# Patient Record
Sex: FEMALE | Race: OTHER | Employment: FULL TIME | URBAN - METROPOLITAN AREA
[De-identification: names, ages, dates, MRNs, and addresses within clinical notes are randomized per-mention and may not be internally consistent; named-entity substitution may affect disease eponyms.]

---

## 2017-05-23 PROBLEM — M43.16 SPONDYLOLISTHESIS OF LUMBAR REGION: Status: ACTIVE | Noted: 2017-05-23

## 2017-10-25 ENCOUNTER — HOSPITAL ENCOUNTER (OUTPATIENT)
Dept: MAMMOGRAPHY | Age: 52
Discharge: HOME | End: 2017-10-25
Attending: FAMILY MEDICINE
Payer: COMMERCIAL

## 2017-10-25 DIAGNOSIS — Z12.31 VISIT FOR SCREENING MAMMOGRAM: ICD-10-CM

## 2017-10-25 PROCEDURE — 77067 SCR MAMMO BI INCL CAD: CPT

## 2018-06-07 ENCOUNTER — HOSPITAL ENCOUNTER (INPATIENT)
Dept: HOSPITAL 90 - EDH | Age: 53
LOS: 2 days | Discharge: HOME | DRG: 812 | End: 2018-06-09
Attending: FAMILY MEDICINE | Admitting: FAMILY MEDICINE
Payer: COMMERCIAL

## 2018-06-07 VITALS — SYSTOLIC BLOOD PRESSURE: 97 MMHG | DIASTOLIC BLOOD PRESSURE: 56 MMHG

## 2018-06-07 VITALS — BODY MASS INDEX: 27.66 KG/M2 | WEIGHT: 156.1 LBS | HEIGHT: 63 IN

## 2018-06-07 VITALS — SYSTOLIC BLOOD PRESSURE: 110 MMHG | DIASTOLIC BLOOD PRESSURE: 69 MMHG

## 2018-06-07 DIAGNOSIS — D64.9: Primary | ICD-10-CM

## 2018-06-07 DIAGNOSIS — E11.9: ICD-10-CM

## 2018-06-07 DIAGNOSIS — Z79.899: ICD-10-CM

## 2018-06-07 DIAGNOSIS — N30.00: ICD-10-CM

## 2018-06-07 DIAGNOSIS — I10: ICD-10-CM

## 2018-06-07 DIAGNOSIS — E78.5: ICD-10-CM

## 2018-06-07 LAB
ALBUMIN SERPL-MCNC: 3.4 G/DL (ref 3.5–5)
ALT SERPL-CCNC: 24 U/L (ref 12–78)
APTT PPP: 29.3 SEC (ref 26.3–35.5)
AST SERPL-CCNC: 17 U/L (ref 10–37)
BASOPHILS NFR BLD AUTO: 0.5 % (ref 0–5)
BILIRUB SERPL-MCNC: 0.7 MG/DL (ref 0.2–1)
BUN SERPL-MCNC: 5 MG/DL (ref 7–18)
CHLORIDE SERPL-SCNC: 98 MMOL/L (ref 101–111)
CK MB SERPL-MCNC: < 0.5 NG/ML (ref 0.5–3.6)
CK SERPL-CCNC: 51 U/L (ref 21–232)
CO2 SERPL-SCNC: 23 MMOL/L (ref 21–32)
CREAT SERPL-MCNC: 0.8 MG/DL (ref 0.5–1.5)
EOSINOPHIL NFR BLD AUTO: 0.2 % (ref 0–8)
ERYTHROCYTE [DISTWIDTH] IN BLOOD BY AUTOMATED COUNT: 20.7 % (ref 11–15.5)
GFR SERPL CREATININE-BSD FRML MDRD: 80 ML/MIN (ref 60–?)
GLUCOSE SERPL-MCNC: 221 MG/DL (ref 70–105)
HCT VFR BLD AUTO: 23.5 % (ref 36–48)
INR PPP: 0.98 (ref 0.85–1.15)
KETONES UR STRIP-MCNC: 15 MG/DL
LYMPHOCYTES NFR SPEC AUTO: 10.5 % (ref 21–51)
MCH RBC QN AUTO: 18.2 PG (ref 27–33)
MCHC RBC AUTO-ENTMCNC: 30.2 G/DL (ref 32–36)
MCV RBC AUTO: 60.4 FL (ref 79–99)
MONOCYTES NFR BLD AUTO: 8.1 % (ref 3–13)
MYOGLOBIN SERPL-MCNC: 17 NG/ML (ref 10–92)
NEUTROPHILS NFR BLD AUTO: 80.7 % (ref 40–77)
NRBC BLD MANUAL-RTO: 0 % (ref 0–0.19)
PH UR STRIP: 5.5 [PH] (ref 5–8)
PLATELET # BLD AUTO: 262 K/UL (ref 130–400)
POTASSIUM SERPL-SCNC: 3.2 MMOL/L (ref 3.5–5.1)
PROT SERPL-MCNC: 8.2 G/DL (ref 6–8.3)
PROT UR QL STRIP: (no result)
PROTHROMBIN TIME: 10.3 SEC (ref 9.6–11.6)
RBC # BLD AUTO: 3.9 MIL/UL (ref 4–5.5)
RBC #/AREA URNS HPF: (no result) /HPF (ref 0–1)
RBC MORPH BLD: (no result)
SODIUM SERPL-SCNC: 131 MMOL/L (ref 136–145)
SP GR UR STRIP: 1.02 (ref 1–1.03)
TROPONIN I SERPL-MCNC: < 0.04 NG/ML (ref 0–0.06)
UROBILINOGEN UR STRIP-MCNC: 1 MG/DL (ref 0.2–1)
WBC # BLD AUTO: 10.3 K/UL (ref 4.8–10.8)
WBC #/AREA URNS HPF: (no result) /HPF (ref 0–1)

## 2018-06-07 PROCEDURE — 36415 COLL VENOUS BLD VENIPUNCTURE: CPT

## 2018-06-07 PROCEDURE — 85730 THROMBOPLASTIN TIME PARTIAL: CPT

## 2018-06-07 PROCEDURE — 83605 ASSAY OF LACTIC ACID: CPT

## 2018-06-07 PROCEDURE — 81001 URINALYSIS AUTO W/SCOPE: CPT

## 2018-06-07 PROCEDURE — 87040 BLOOD CULTURE FOR BACTERIA: CPT

## 2018-06-07 PROCEDURE — 86922 COMPATIBILITY TEST ANTIGLOB: CPT

## 2018-06-07 PROCEDURE — 80053 COMPREHEN METABOLIC PANEL: CPT

## 2018-06-07 PROCEDURE — 36430 TRANSFUSION BLD/BLD COMPNT: CPT

## 2018-06-07 PROCEDURE — 85610 PROTHROMBIN TIME: CPT

## 2018-06-07 PROCEDURE — 87088 URINE BACTERIA CULTURE: CPT

## 2018-06-07 PROCEDURE — 80048 BASIC METABOLIC PNL TOTAL CA: CPT

## 2018-06-07 PROCEDURE — 93005 ELECTROCARDIOGRAM TRACING: CPT

## 2018-06-07 PROCEDURE — 86900 BLOOD TYPING SEROLOGIC ABO: CPT

## 2018-06-07 PROCEDURE — 71045 X-RAY EXAM CHEST 1 VIEW: CPT

## 2018-06-07 PROCEDURE — 82550 ASSAY OF CK (CPK): CPT

## 2018-06-07 PROCEDURE — 30233N1 TRANSFUSION OF NONAUTOLOGOUS RED BLOOD CELLS INTO PERIPHERAL VEIN, PERCUTANEOUS APPROACH: ICD-10-PCS

## 2018-06-07 PROCEDURE — 84484 ASSAY OF TROPONIN QUANT: CPT

## 2018-06-07 PROCEDURE — 86901 BLOOD TYPING SEROLOGIC RH(D): CPT

## 2018-06-07 PROCEDURE — 82553 CREATINE MB FRACTION: CPT

## 2018-06-07 PROCEDURE — 86850 RBC ANTIBODY SCREEN: CPT

## 2018-06-07 PROCEDURE — 83874 ASSAY OF MYOGLOBIN: CPT

## 2018-06-07 PROCEDURE — 85025 COMPLETE CBC W/AUTO DIFF WBC: CPT

## 2018-06-08 VITALS — DIASTOLIC BLOOD PRESSURE: 65 MMHG | SYSTOLIC BLOOD PRESSURE: 138 MMHG

## 2018-06-08 VITALS — DIASTOLIC BLOOD PRESSURE: 63 MMHG | SYSTOLIC BLOOD PRESSURE: 101 MMHG

## 2018-06-08 VITALS — SYSTOLIC BLOOD PRESSURE: 139 MMHG | DIASTOLIC BLOOD PRESSURE: 79 MMHG

## 2018-06-08 VITALS — DIASTOLIC BLOOD PRESSURE: 56 MMHG | SYSTOLIC BLOOD PRESSURE: 109 MMHG

## 2018-06-08 VITALS — DIASTOLIC BLOOD PRESSURE: 69 MMHG | SYSTOLIC BLOOD PRESSURE: 141 MMHG

## 2018-06-08 VITALS — DIASTOLIC BLOOD PRESSURE: 53 MMHG | SYSTOLIC BLOOD PRESSURE: 101 MMHG

## 2018-06-08 VITALS — DIASTOLIC BLOOD PRESSURE: 69 MMHG | SYSTOLIC BLOOD PRESSURE: 140 MMHG

## 2018-06-08 LAB
BASOPHILS NFR BLD AUTO: 0.5 % (ref 0–5)
BUN SERPL-MCNC: 6 MG/DL (ref 7–18)
CHLORIDE SERPL-SCNC: 102 MMOL/L (ref 101–111)
CO2 SERPL-SCNC: 27 MMOL/L (ref 21–32)
CREAT SERPL-MCNC: 0.7 MG/DL (ref 0.5–1.5)
EOSINOPHIL NFR BLD AUTO: 0.4 % (ref 0–8)
ERYTHROCYTE [DISTWIDTH] IN BLOOD BY AUTOMATED COUNT: 24.3 % (ref 11–15.5)
GFR SERPL CREATININE-BSD FRML MDRD: 93 ML/MIN (ref 60–?)
GLUCOSE SERPL-MCNC: 186 MG/DL (ref 70–105)
HCT VFR BLD AUTO: 27.1 % (ref 36–48)
LYMPHOCYTES NFR SPEC AUTO: 15.5 % (ref 21–51)
MCH RBC QN AUTO: 20.1 PG (ref 27–33)
MCHC RBC AUTO-ENTMCNC: 31.3 G/DL (ref 32–36)
MCV RBC AUTO: 64 FL (ref 79–99)
MONOCYTES NFR BLD AUTO: 10 % (ref 3–13)
NEUTROPHILS NFR BLD AUTO: 73.6 % (ref 40–77)
NRBC BLD MANUAL-RTO: 0.1 % (ref 0–0.19)
PLATELET # BLD AUTO: 256 K/UL (ref 130–400)
POTASSIUM SERPL-SCNC: 3.9 MMOL/L (ref 3.5–5.1)
RBC # BLD AUTO: 4.23 MIL/UL (ref 4–5.5)
SODIUM SERPL-SCNC: 137 MMOL/L (ref 136–145)
WBC # BLD AUTO: 8.3 K/UL (ref 4.8–10.8)

## 2018-06-08 RX ADMIN — WATER SCH ML: 1 INJECTION INTRAMUSCULAR; INTRAVENOUS; SUBCUTANEOUS at 09:09

## 2018-06-08 RX ADMIN — SODIUM CHLORIDE SCH GM: 9 INJECTION, SOLUTION INTRAVENOUS at 09:09

## 2018-06-08 RX ADMIN — ACETAMINOPHEN PRN MG: 325 TABLET, FILM COATED ORAL at 20:46

## 2018-06-08 RX ADMIN — ACETAMINOPHEN PRN MG: 325 TABLET, FILM COATED ORAL at 04:40

## 2018-06-09 VITALS — SYSTOLIC BLOOD PRESSURE: 112 MMHG | DIASTOLIC BLOOD PRESSURE: 71 MMHG

## 2018-06-09 VITALS — DIASTOLIC BLOOD PRESSURE: 71 MMHG | SYSTOLIC BLOOD PRESSURE: 132 MMHG

## 2018-06-09 VITALS — SYSTOLIC BLOOD PRESSURE: 114 MMHG | DIASTOLIC BLOOD PRESSURE: 73 MMHG

## 2018-06-09 LAB
BASOPHILS NFR BLD AUTO: 0.6 % (ref 0–5)
BUN SERPL-MCNC: 7 MG/DL (ref 7–18)
CHLORIDE SERPL-SCNC: 104 MMOL/L (ref 101–111)
CO2 SERPL-SCNC: 24 MMOL/L (ref 21–32)
CREAT SERPL-MCNC: 0.6 MG/DL (ref 0.5–1.5)
EOSINOPHIL NFR BLD AUTO: 3.7 % (ref 0–8)
ERYTHROCYTE [DISTWIDTH] IN BLOOD BY AUTOMATED COUNT: 25.4 % (ref 11–15.5)
GFR SERPL CREATININE-BSD FRML MDRD: 112 ML/MIN (ref 60–?)
GLUCOSE SERPL-MCNC: 124 MG/DL (ref 70–105)
HCT VFR BLD AUTO: 27.5 % (ref 36–48)
LYMPHOCYTES NFR SPEC AUTO: 26.1 % (ref 21–51)
MCH RBC QN AUTO: 20.2 PG (ref 27–33)
MCHC RBC AUTO-ENTMCNC: 31.4 G/DL (ref 32–36)
MCV RBC AUTO: 64.4 FL (ref 79–99)
MONOCYTES NFR BLD AUTO: 13.2 % (ref 3–13)
NEUTROPHILS NFR BLD AUTO: 56.4 % (ref 40–77)
NRBC BLD MANUAL-RTO: 0.1 % (ref 0–0.19)
PLATELET # BLD AUTO: 244 K/UL (ref 130–400)
POTASSIUM SERPL-SCNC: 3.5 MMOL/L (ref 3.5–5.1)
RBC # BLD AUTO: 4.26 MIL/UL (ref 4–5.5)
SODIUM SERPL-SCNC: 137 MMOL/L (ref 136–145)
WBC # BLD AUTO: 6 K/UL (ref 4.8–10.8)

## 2018-06-09 RX ADMIN — POTASSIUM CHLORIDE PRN MEQ: 1500 TABLET, EXTENDED RELEASE ORAL at 12:10

## 2018-06-09 RX ADMIN — POTASSIUM CHLORIDE PRN MEQ: 1500 TABLET, EXTENDED RELEASE ORAL at 06:03

## 2018-06-09 RX ADMIN — POTASSIUM CHLORIDE PRN MEQ: 1500 TABLET, EXTENDED RELEASE ORAL at 09:23

## 2018-06-09 RX ADMIN — ACETAMINOPHEN PRN MG: 325 TABLET, FILM COATED ORAL at 10:53

## 2018-06-09 RX ADMIN — SODIUM CHLORIDE SCH GM: 9 INJECTION, SOLUTION INTRAVENOUS at 09:23

## 2018-06-09 RX ADMIN — WATER SCH ML: 1 INJECTION INTRAMUSCULAR; INTRAVENOUS; SUBCUTANEOUS at 09:23

## 2018-08-14 PROBLEM — R00.2 HEART PALPITATIONS: Status: ACTIVE | Noted: 2018-08-14

## 2018-10-27 ENCOUNTER — HOSPITAL ENCOUNTER (OUTPATIENT)
Dept: MAMMOGRAPHY | Age: 53
Discharge: HOME | End: 2018-10-27
Attending: FAMILY MEDICINE
Payer: COMMERCIAL

## 2018-10-27 DIAGNOSIS — Z12.31 VISIT FOR SCREENING MAMMOGRAM: ICD-10-CM

## 2018-10-27 PROCEDURE — 77067 SCR MAMMO BI INCL CAD: CPT

## 2018-12-03 PROBLEM — R19.7 DIARRHEA: Status: ACTIVE | Noted: 2018-12-03

## 2018-12-03 PROBLEM — E11.40 TYPE 2 DIABETES MELLITUS WITH DIABETIC NEUROPATHY (HCC): Status: ACTIVE | Noted: 2018-12-03

## 2018-12-03 PROBLEM — R10.84 ABDOMINAL PAIN, GENERALIZED: Status: ACTIVE | Noted: 2018-12-03

## 2019-05-30 ENCOUNTER — HOSPITAL ENCOUNTER (OUTPATIENT)
Dept: PHYSICAL THERAPY | Age: 54
Discharge: HOME | End: 2019-05-30
Payer: COMMERCIAL

## 2019-05-30 DIAGNOSIS — G89.29 CHRONIC LEFT SHOULDER PAIN: ICD-10-CM

## 2019-05-30 DIAGNOSIS — M25.512 CHRONIC LEFT SHOULDER PAIN: ICD-10-CM

## 2019-05-30 DIAGNOSIS — M54.2 NECK PAIN ON LEFT SIDE: ICD-10-CM

## 2019-05-30 PROCEDURE — 97110 THERAPEUTIC EXERCISES: CPT

## 2019-05-30 PROCEDURE — 97161 PT EVAL LOW COMPLEX 20 MIN: CPT

## 2019-05-30 NOTE — PROGRESS NOTES
Lauro Simple  : 1965  Primary: Dorina Newton Of Tab Perez*  Secondary:  Therapy Center at [facility], [address] [facility]  [phone]   [phone]        OUTPATIENT PHYSICAL THERAPY: Daily Treatment Note 2019  Visit Count:  1    ICD-10: Treatment Diagnosis: Cervicalgia (M54.2), Pain in left shoulder (M25.512), Muscle weakness, generalized (M62.81)  Precautions/Allergies:   Crestor [rosuvastatin]; Levothyroxine; Lisinopril; Lisinopril-hydrochlorothiazide; and Pravastatin   TREATMENT PLAN:  Effective Dates: 2019 TO 2019 (60 days). Frequency/Duration: 2 times a week for 60 Day(s)    Pre-treatment Symptoms/Complaints:  Patient says she has continued to have lots of trouble lifting at work the past few weeks. Pain: Initial: Pain Intensity 1: 2/10 Post Session:  2/10   Medications Last Reviewed:  2019  Updated Objective Findings:  See evaluation note from today  TREATMENT:     Therapeutic Exercise: (15 Minutes):  Exercises per grid below to improve mobility and strength. Required moderate visual, verbal and manual cues to promote proper body alignment, promote proper body posture, promote proper body mechanics and promote proper body breathing techniques. Progressed resistance, range, repetitions and complexity of movement as indicated. Date:  2019     Activity/Exercise Parameters   Patient Education Plan of care, HEP   Thoracic extension  Foam roll, 2 x 10   Cervical openers Rotation, side bend to right, 2 x 10   Breathing with reach 5 breaths x 3                     Treatment/Session Summary:    · Response to Treatment:  Patient has good tolerance to initial HEP tasks. · Communication/Consultation:  None today  · Equipment provided today:  None today  · Recommendations/Intent for next treatment session: Next visit will focus on improving pain, cervical and left UE strength and mobility.     Total Treatment Billable Duration:  15 minutes (45 minute evaluation)   PT Patient Time In/Time Out  Time In: 0730  Time Out: 0830  Markell Owen    Future Appointments   Date Time Provider Beatriz Gonsalez   6/4/2019  7:30 AM Consuello Shaquille [facility]   6/6/2019  4:30 PM Yoon Guevara [facility]   6/10/2019  7:30 AM Dajuan Parekh [facility]   6/13/2019  7:30 AM Consuello Shaquille [facility]   6/19/2019  8:00 AM Consuello Shaquille [facility]   6/21/2019  8:00 AM Consuello Shaquille [facility]   6/25/2019  7:30 AM Consuello Shaquille [facility]   6/27/2019  7:30 AM Consuello Shaquille [facility]   11/20/2019  8:30 AM PST LAB [facility] PST PST   12/2/2019  8:20 AM Zakiya Heart DO [facility] PST PST

## 2019-05-30 NOTE — THERAPY EVALUATION
Yancy Byrdkristin  : 1965  Primary: Ray Hua Of Tab Perez*  Secondary:  Therapy Center at [address]  [phone]   [phone]          OUTPATIENT PHYSICAL THERAPY:Initial Assessment 2019   ICD-10: Treatment Diagnosis: Cervicalgia (M54.2), Pain in left shoulder (M25.512), Muscle weakness, generalized (M62.81)  Precautions/Allergies:   Crestor [rosuvastatin]; Levothyroxine; Lisinopril; Lisinopril-hydrochlorothiazide; and Pravastatin   TREATMENT PLAN:  Effective Dates: 2019 TO 2019 (60 days). Frequency/Duration: 2 times a week for 60 Day(s) MEDICAL/REFERRING DIAGNOSIS:  Neck pain on left side [M54.2]  Chronic left shoulder pain [M25.512, G89.29]   DATE OF ONSET: Chronic  REFERRING PHYSICIAN: Sarahi Regalado DO MD Orders: Evaluate and Treat  Return MD Appointment: TBD     INITIAL ASSESSMENT:  Ms. Jay Iqbal presents with chronic history of neck and left shoulder pain. Her chief complaint is pain but also presents with decreased cervical mobility and shoulder strength which limits her ability to perform ADL and work related tasks without pain or difficulty. She will benefit from skilled therapy to improve her pain, strength and mobility to return to prior level of function. PROBLEM LIST (Impacting functional limitations):  1. Decreased Strength  2. Decreased ADL/Functional Activities  3. Increased Pain  4. Decreased Activity Tolerance  5. Decreased Flexibility/Joint Mobility  6. Decreased Saline with Home Exercise Program INTERVENTIONS PLANNED: (Treatment may consist of any combination of the following)  1. Home Exercise Program (HEP)  2. Manual Therapy  3. Range of Motion (ROM)  4. Therapeutic Exercise/Strengthening     GOALS: (Goals have been discussed and agreed upon with patient.)  Discharge Goals: Time Frame: 60 days  1.  Patient will be independent with home exercise program without assistance from therapist.   2. Patient will report Quick DASH score to 25/55 or less to demonstrate improved functional capacity. 3. Patient will demonstrate pain free lifting with left arm to resume normal work tasks with less difficulty. 4. Patient will perform carrying and reaching tasks without complaints of neck or shoulder pain to demonstrate improved functional mobility. OUTCOME MEASURE:   Tool Used: Disabilities of the Arm, Shoulder and Hand (DASH) Questionnaire - Quick Version  Score:  Initial: 34/55 (5/30/19)  Most Recent: X/55 (Date: -- )   Interpretation of Score: The DASH is designed to measure the activities of daily living in person's with upper extremity dysfunction or pain. Each section is scored on a 1-5 scale, 5 representing the greatest disability. The scores of each section are added together for a total score of 55. MEDICAL NECESSITY:   · Patient is expected to demonstrate progress in strength and range of motion to increase independence with ADL and work related tasks. REASON FOR SERVICES/OTHER COMMENTS:  · Patient continues to require present interventions due to patient's inability to lift, reach and carry without pain. Total Duration:  PT Patient Time In/Time Out  Time In: 0730  Time Out: 0830    Rehabilitation Potential For Stated Goals: Good       PAIN/SUBJECTIVE:   Initial: Pain Intensity 1: 2 /10 Post Session:  1/10   HISTORY:   History of Injury/Illness (Reason for Referral):  Dunia Rubio presents with neck and left shoulder pain which has persisted over the past few years. She states she has trouble lifting and carrying things at work, which is very physically demanding. Her goal is to resume normal activities without pain.    Past Medical History/Comorbidities:   Ms. Humphrey Campa  has a past medical history of B12 deficiency (6/10/2014), Diabetes mellitus, type II ([address] Encounter for long-term (current) use of other medications (10/4/2013), GERD (gastroesophageal reflux disease), HTN (hypertension) (11/19/2012), Hypercholesteremia (11/19/2012), Hyperlipidemia, Hypothyroid (11/19/2012), Menopause, Neuropathic pain, leg, bilateral (4/16/2013), Non compliance w medication regimen (1/26/2015), Palpitations, Shoulder pain, Spondylolisthesis, and Vitamin D deficiency (11/19/2012). Ms. Richard Hidalgo  has a past surgical history that includes hx breast reduction (1999). Social History/Living Environment:     Lives in private setting home, no barriers to progress. Prior Level of Function/Work/Activity:  Works in [employer] Side:         RIGHT  Previous Treatment Approaches:          Bout of physical therapy for similar symptoms in 2016   Ambulatory/Rehab Services H2 Model Falls Risk Assessment (5/30/19)   Risk Factors:       No Risk Factors Identified Ability to Rise from Chair:       (0)  Ability to rise in a single movement   Falls Prevention Plan:       No modifications necessary   Total: (5 or greater = High Risk): 0   ©2010 [facility] [address] [place] [address] Patent #[id number]. Federal Law prohibits the replication, distribution or use without written permission from [facility] [employer]   Current Medications:       Current Outpatient Medications:     levothyroxine (SYNTHROID) 112 mcg tablet, Take 1 Tab by mouth Daily (before breakfast). , Disp: 30 Tab, Rfl: 6    metoprolol tartrate (LOPRESSOR) 25 mg tablet, Take 1 Tab by mouth two (2) times a day., Disp: 60 Tab, Rfl: 3    ergocalciferol (ERGOCALCIFEROL) 50,000 unit capsule, Take 1 Cap by mouth every seven (7) days. , Disp: 12 Cap, Rfl: 3    ezetimibe (ZETIA) 10 mg tablet, Take 1 Tab by mouth daily. Indications: high cholesterol, Disp: 90 Tab, Rfl: 3    losartan-hydroCHLOROthiazide (HYZAAR) 100-25 mg per tablet, Take 1 Tab by mouth daily. , Disp: 90 Tab, Rfl: 3    simvastatin (ZOCOR) 20 mg tablet, Take 1 Tab by mouth nightly., Disp: 30 Tab, Rfl: 11    cyclobenzaprine (FLEXERIL) 10 mg tablet, Take 1 Tab by mouth nightly., Disp: 90 Tab, Rfl: 0    naproxen (NAPROSYN) 500 mg tablet, Take 1 Tab by mouth two (2) times daily (with meals). , Disp: 60 Tab, Rfl: 1   Date Last Reviewed:  5/30/2019     Number of Personal Factors/Comorbidities that affect the Plan of Care: 1-2: MODERATE COMPLEXITY   EXAMINATION:   Observation/Orthostatic Postural Assessment:          Patient exhibits mild forward head and rounded shoulders in static sitting postures. Palpation:  Patient is tender to palpation along left levator scapulae, upper trapezius musculature. ROM:   Date: 5/30/19   Flexion WFL   Extension WFL   SBL WFL, painful   SBR WFL   Rot L WFL, painful   Rot R  WFL     Strength:          Gross UE Strength: Right- 5/5, Left- 4/5  Neurological Screen:        Dermatomes:  Pain along C6/7 dermatome        Reflexes:  Normal        Neural Tension Tests:  Normal        Sensation: Normal  Functional Mobility:         Gait/Ambulation:  Normal        Transfers:  Normal            (SB=sidebending, Rot=rotation, TTP=tender to palpation, ULTTA=upper limb tension test-A, UQS=upper quarter scan, WNL=within normal limits; ROM measured in degrees)           Body Structures Involved:  1. Nerves  2. Bones  3. Joints  4. Muscles Body Functions Affected:  1. Sensory/Pain  2. Neuromusculoskeletal  3. Movement Related Activities and Participation Affected:  1. General Tasks and Demands  2. Mobility  3. Self Care  4. Domestic Life  5.  Community, Social and [place]   Number of elements (examined above) that affect the Plan of Care: 4+: HIGH COMPLEXITY   CLINICAL PRESENTATION:   Presentation: Stable and uncomplicated: LOW COMPLEXITY   CLINICAL DECISION MAKING:   Use of outcome tool(s) and clinical judgement create a POC that gives a: Clear prediction of patient's progress: LOW COMPLEXITY

## 2019-06-04 ENCOUNTER — HOSPITAL ENCOUNTER (OUTPATIENT)
Dept: PHYSICAL THERAPY | Age: 54
Discharge: HOME | End: 2019-06-04
Payer: COMMERCIAL

## 2019-06-04 PROCEDURE — 97110 THERAPEUTIC EXERCISES: CPT

## 2019-06-04 PROCEDURE — 97140 MANUAL THERAPY 1/> REGIONS: CPT

## 2019-06-04 NOTE — PROGRESS NOTES
Laura Irving  : 1965  Primary: Teresa Francois Of Tab Perez*  Secondary:  Therapy Center at [facility], [address] [facility]  [phone]   [phone]        OUTPATIENT PHYSICAL THERAPY: Daily Treatment Note 2019  Visit Count:  2    ICD-10: Treatment Diagnosis: Cervicalgia (M54.2), Pain in left shoulder (M25.512), Muscle weakness, generalized (M62.81)  Precautions/Allergies:   Crestor [rosuvastatin]; Levothyroxine; Lisinopril; Lisinopril-hydrochlorothiazide; and Pravastatin   TREATMENT PLAN:  Effective Dates: 2019 TO 2019 (60 days). Frequency/Duration: 2 times a week for 60 Day(s)    Pre-treatment Symptoms/Complaints:  Patient says she has continued to have pain, especially at work since last week. Pain: Initial: Pain Intensity 1: 210 Post Session:  2/10   Medications Last Reviewed:  2019  Updated Objective Findings:  None Today  TREATMENT:     Therapeutic Exercise: (30 Minutes):  Exercises per grid below to improve mobility and strength. Required moderate visual, verbal and manual cues to promote proper body alignment, promote proper body posture, promote proper body mechanics and promote proper body breathing techniques. Progressed resistance, range, repetitions and complexity of movement as indicated. Date:  2019     Activity/Exercise Parameters   Patient Education Plan of care, HEP   Thoracic extension  Foam roll, 2 x 10   Cervical openers Rotation, side bend to right, 2 x 10   Breathing with reach 5 breaths x 3   Open book 2 x 10   Cervical SNAG To right, 2 x 10   Rows Low and mid; 2 x 10 each    Foam roll flexion 2 x 10         Manual Therapy (    Soft Tissue Mobilization Duration  Duration: 25 Minutes): Manual techniques to facilitate improved motion and decreased pain.  (Used abbreviations: MET - muscle energy technique; PNF - proprioceptive neuromuscular facilitation; NMR - neuromuscular re-education; a/p - anterior to posterior; p/a - posterior to anterior)   · Suboccipital release  · Manual cervical distraction  · Manual side bending, rotation to right      Treatment/Session Summary:    · Response to Treatment:  Patient responds well to more cervical opening and scapular strengthening tasks today and reports slight decrease in her overall discomfort at end of today's session. · Communication/Consultation:  None today  · Equipment provided today:  None today  · Recommendations/Intent for next treatment session: Next visit will focus on improving pain, cervical and left UE strength and mobility. Total Treatment Billable Duration:  55 minutes    PT Patient Time In/Time Out  Time In: 0730  Time Out: 0830  Markell Owen    Future Appointments   Date Time Provider Beatriz Gonsalez   6/6/2019  4:30 PM Marian [facility]   6/10/2019  7:30 AM Prem [facility]   6/13/2019  7:30 AM Prem [facility]   6/19/2019  8:00 AM [facility]   6/21/2019  8:00 AM [facility]   6/25/2019  7:30 AM Marian Thurman [facility]   6/27/2019  7:30 AM Marian Thurman [facility]   11/20/2019  8:30 AM PST LAB [facility] PST PST   12/2/2019  8:20 AM DO SKYLAR Wiggins PST PST

## 2019-06-06 ENCOUNTER — HOSPITAL ENCOUNTER (OUTPATIENT)
Dept: PHYSICAL THERAPY | Age: 54
Discharge: HOME | End: 2019-06-06
Payer: COMMERCIAL

## 2019-06-06 PROCEDURE — 97110 THERAPEUTIC EXERCISES: CPT

## 2019-06-06 PROCEDURE — 97140 MANUAL THERAPY 1/> REGIONS: CPT

## 2019-06-06 NOTE — PROGRESS NOTES
Javan Pierre  : 1965  Primary: Trevon Payer Of Tab Perez*  Secondary:  [facility]rapy Center at [facility] [address], [address] [facility]  [phone]   [phone]        OUTPATIENT PHYSICAL THERAPY: Daily Treatment Note 2019  Visit Count:  3    ICD-10: Treatment Diagnosis: Cervicalgia (M54.2), Pain in left shoulder (M25.512), Muscle weakness, generalized (M62.81)  Precautions/Allergies:   Crestor [rosuvastatin]; Levothyroxine; Lisinopril; Lisinopril-hydrochlorothiazide; and Pravastatin   TREATMENT PLAN:  Effective Dates: 2019 TO 2019 (60 days). Frequency/Duration: 2 times a week for 60 Day(s)    Pre-treatment Symptoms/Complaints:  Patient reports her neck/shoulder is very sore this afternoon. Pain: Initial: Pain Intensity 1: 4/10 Post Session:  0/10   Medications Last Reviewed:  2019  Updated Objective Findings:  None Today  TREATMENT:     Therapeutic Exercise: (25 Minutes):  Exercises per grid below to improve mobility and strength. Required moderate visual, verbal and manual cues to promote proper body alignment, promote proper body posture, promote proper body mechanics and promote proper body breathing techniques. Progressed resistance, range, repetitions and complexity of movement as indicated. Date:  2019     Activity/Exercise Parameters   Patient Education Update HEP   Thoracic extension  --   Cervical openers Rotation, side bend to right, 2 x 10   Breathing with reach --   Open book 2 x 10   Cervical SNAG To right, 2 x 10   Rows Low and mid; 2 x 10 each    Foam roll flexion 2 x 10         Manual Therapy (    Soft Tissue Mobilization Duration  Duration: 30 Minutes): Manual techniques to facilitate improved motion and decreased pain.  (Used abbreviations: MET - muscle energy technique; PNF - proprioceptive neuromuscular facilitation; NMR - neuromuscular re-education; a/p - anterior to posterior; p/a - posterior to anterior) · Suboccipital release  · Manual cervical distraction  · Manual side bending, rotation to right  · IASTM to upper trapezius, levator       Treatment/Session Summary:    · Response to Treatment:  Patient has excellent response to manual therapy today, reporting no pain at end of session. · Communication/Consultation:  None today  · Equipment provided today:  None today  · Recommendations/Intent for next treatment session: Next visit will focus on improving pain, cervical and left UE strength and mobility. Total Treatment Billable Duration:  55 minutes    PT Patient Time In/Time Out  Time In: 1625  Time Out: Teo SIMON [address].  Lexie    Future Appointments   Date Time Provider Beatriz Gonsalez   6/10/2019  7:30 AM Jessica Peace [facility]   6/13/2019  7:30 AM Mahin Owen [facility]   6/19/2019  8:00 AM Rue Peace [facility]   6/21/2019  8:00 AM Rue Peace [facility]   6/25/2019  7:30 AM Rue Peace [facility]   6/27/2019  7:30 AM Rue Peace [facility]   11/20/2019  8:30 AM PST LAB [facility] PST PST   12/2/2019  8:20 AM Ginger Henry DO [facility] PST PST

## 2019-06-10 ENCOUNTER — HOSPITAL ENCOUNTER (OUTPATIENT)
Dept: PHYSICAL THERAPY | Age: 54
Discharge: HOME | End: 2019-06-10
Payer: COMMERCIAL

## 2019-06-10 PROCEDURE — 97110 THERAPEUTIC EXERCISES: CPT

## 2019-06-10 PROCEDURE — 97140 MANUAL THERAPY 1/> REGIONS: CPT

## 2019-06-10 NOTE — PROGRESS NOTES
Lauro Simple  : 1965  Primary: Dorina Newton Of Tab Perez*  Secondary:  Therapy Center at [facility] [address], [address] [facility]  [phone]   [phone]        OUTPATIENT PHYSICAL THERAPY: Daily Treatment Note 6/10/2019  Visit Count:  4    ICD-10: Treatment Diagnosis: Cervicalgia (M54.2), Pain in left shoulder (M25.512), Muscle weakness, generalized (M62.81)  Precautions/Allergies:   Crestor [rosuvastatin]; Levothyroxine; Lisinopril; Lisinopril-hydrochlorothiazide; and Pravastatin   TREATMENT PLAN:  Effective Dates: 2019 TO 2019 (60 days). Frequency/Duration: 2 times a week for 60 Day(s)    Pre-treatment Symptoms/Complaints:  Patient says she was sore after last visit but feels better this morning. Pain: Initial: Pain Intensity 1: 3/10 Post Session:  0/10   Medications Last Reviewed:  6/10/2019  Updated Objective Findings:  None Today  TREATMENT:     Therapeutic Exercise: (25 Minutes):  Exercises per grid below to improve mobility and strength. Required moderate visual, verbal and manual cues to promote proper body alignment, promote proper body posture, promote proper body mechanics and promote proper body breathing techniques. Progressed resistance, range, repetitions and complexity of movement as indicated. Date:  6/10/2019     Activity/Exercise Parameters   Patient Education Update HEP   Thoracic extension  --   Cervical openers Rotation, side bend to right, 2 x 10   Breathing with reach 5 min   Open book 2 x 10   Cervical SNAG To right, 2 x 10   Rows Low and mid; 2 x 10 each    Foam roll flexion --         Manual Therapy (    Soft Tissue Mobilization Duration  Duration: 30 Minutes): Manual techniques to facilitate improved motion and decreased pain.  (Used abbreviations: MET - muscle energy technique; PNF - proprioceptive neuromuscular facilitation; NMR - neuromuscular re-education; a/p - anterior to posterior; p/a - posterior to anterior)   · Suboccipital release  · Manual cervical distraction  · Manual side bending, rotation to right  · IASTM to upper trapezius, levator       Treatment/Session Summary:    · Response to Treatment:  Patient continues to respond well to manual therapy to reduce her neck and shoulder pain. Stress management and mindfulness practices discussed at length today as patient tends to have worsening pain when her stress levels are higher. · Communication/Consultation:  None today  · Equipment provided today:  None today  · Recommendations/Intent for next treatment session: Next visit will focus on improving pain, cervical and left UE strength and mobility. Total Treatment Billable Duration:  55 minutes    PT Patient Time In/Time Out  Time In: 0730  Time Out: 0830  Markell Owen    Future Appointments   Date Time Provider Beatriz Gonsalez   6/13/2019  7:30 AM Adelaide Stuart [facility]   6/19/2019  8:00 AM Lizbeth Wren [facility]   6/21/2019  8:00 AM Adelaide Stuart [facility]   6/25/2019  7:30 AM Adelaide Stuart [facility]   6/27/2019  [facility]   11/20/2019  8:30 AM PST LAB [facility] PST PST   12/2/2019  8:20 AM DO SKYLAR Rutledge PST PST

## 2019-06-13 ENCOUNTER — HOSPITAL ENCOUNTER (OUTPATIENT)
Dept: PHYSICAL THERAPY | Age: 54
Discharge: HOME | End: 2019-06-13
Payer: COMMERCIAL

## 2019-06-13 PROCEDURE — 97140 MANUAL THERAPY 1/> REGIONS: CPT

## 2019-06-13 PROCEDURE — 97110 THERAPEUTIC EXERCISES: CPT

## 2019-06-13 NOTE — PROGRESS NOTES
Diogenes Reynolds  : 1965  Primary: Kate Sterling Of Tab Perez*  Secondary:  [facility]rapy Center at [facility], [address] [facility]  [phone]   [phone]        OUTPATIENT PHYSICAL THERAPY: Daily Treatment Note 2019  Visit Count:  5    ICD-10: Treatment Diagnosis: Cervicalgia (M54.2), Pain in left shoulder (M25.512), Muscle weakness, generalized (M62.81)  Precautions/Allergies:   Crestor [rosuvastatin]; Levothyroxine; Lisinopril; Lisinopril-hydrochlorothiazide; and Pravastatin   TREATMENT PLAN:  Effective Dates: 2019 TO 2019 (60 days). Frequency/Duration: 2 times a week for 60 Day(s)    Pre-treatment Symptoms/Complaints:  Patient says she can tell some improvements since last visit. Pain: Initial: Pain Intensity 1: 2/10 Post Session:  0/10   Medications Last Reviewed:  2019  Updated Objective Findings:  None Today  TREATMENT:     Therapeutic Exercise: (25 Minutes):  Exercises per grid below to improve mobility and strength. Required moderate visual, verbal and manual cues to promote proper body alignment, promote proper body posture, promote proper body mechanics and promote proper body breathing techniques. Progressed resistance, range, repetitions and complexity of movement as indicated. Date:  2019     Activity/Exercise Parameters   Patient Education Update HEP   Thoracic extension  --   Cervical openers Rotation, side bend to right, 2 x 10   Breathing with reach 5 min   Open book 2 x 10   Cervical SNAG To right, 2 x 10   Rows Low and mid; 2 x 10 each    Foam roll flexion 2 x 10         Manual Therapy (    Soft Tissue Mobilization Duration  Duration: 30 Minutes): Manual techniques to facilitate improved motion and decreased pain.  (Used abbreviations: MET - muscle energy technique; PNF - proprioceptive neuromuscular facilitation; NMR - neuromuscular re-education; a/p - anterior to posterior; p/a - posterior to anterior) · Suboccipital release  · Manual cervical distraction  · Manual side bending, rotation to right  · IASTM to upper trapezius, levator       Treatment/Session Summary:    · Response to Treatment:  Patient reports some decreased neck pain following mobility drills during today's session. She has some complaints of slight dizziness with transitional movements today but overall reports no problems at end of session. · Communication/Consultation:  None today  · Equipment provided today:  None today  · Recommendations/Intent for next treatment session: Next visit will focus on improving pain, cervical and left UE strength and mobility. Total Treatment Billable Duration:  55 minutes    PT Patient Time In/Time Out  Time In: 0730  Time Out: 0830  Markell Owen    Future Appointments   Date Time Provider Beatriz Gonsalez   6/19/2019  8:00 AM Yoon Owen [facility]   6/21/2019  8:00 AM [facility]   6/25/2019  7:30 AM [facility]   6/27/2019  7:30 AM [facility]   11/20/2019  8:30 AM PST LAB [facility] PST PST   12/2/2019  8:20 AM DO SKYLAR Baum PST PST

## 2019-06-19 ENCOUNTER — HOSPITAL ENCOUNTER (OUTPATIENT)
Dept: PHYSICAL THERAPY | Age: 54
Discharge: HOME | End: 2019-06-19
Payer: COMMERCIAL

## 2019-06-19 PROCEDURE — 97140 MANUAL THERAPY 1/> REGIONS: CPT

## 2019-06-19 PROCEDURE — 97110 THERAPEUTIC EXERCISES: CPT

## 2019-06-19 NOTE — PROGRESS NOTES
Lorena Miles  : 1965  Primary: Tracy Smith Of Tab Perez*  Secondary:  [facility] at [facility], [address] [facility]  [phone]   [phone]        OUTPATIENT PHYSICAL THERAPY: Daily Treatment Note 2019  Visit Count:  6    ICD-10: Treatment Diagnosis: Cervicalgia (M54.2), Pain in left shoulder (M25.512), Muscle weakness, generalized (M62.81)  Precautions/Allergies:   Crestor [rosuvastatin]; Levothyroxine; Lisinopril; Lisinopril-hydrochlorothiazide; and Pravastatin   TREATMENT PLAN:  Effective Dates: 2019 TO 2019 (60 days). Frequency/Duration: 2 times a week for 60 Day(s)    Pre-treatment Symptoms/Complaints:  Patient reports her left shoulder blade is very sore this morning. Pain: Initial: Pain Intensity 1: 4/10 Post Session:  0/10   Medications Last Reviewed:  2019  Updated Objective Findings:  None Today  TREATMENT:     Therapeutic Exercise: (30 Minutes):  Exercises per grid below to improve mobility and strength. Required moderate visual, verbal and manual cues to promote proper body alignment, promote proper body posture, promote proper body mechanics and promote proper body breathing techniques. Progressed resistance, range, repetitions and complexity of movement as indicated. Date:  2019     Activity/Exercise Parameters   Patient Education Update HEP   Thoracic extension  Over foam roll, 2 x 10   Cervical openers --   Breathing with reach 5 min   Open book 2 x 10   Cervical SNAG To right, 2 x 10   Rows Low and mid; 2 x 10 each    Foam roll flexion 2 x 10   Prone scapular retraction Bilateral, 2 x 10     Manual Therapy (    Soft Tissue Mobilization Duration  Duration: 25 Minutes): Manual techniques to facilitate improved motion and decreased pain.  (Used abbreviations: MET - muscle energy technique; PNF - proprioceptive neuromuscular facilitation; NMR - neuromuscular re-education; a/p - anterior to posterior; p/a - posterior to anterior)   · Suboccipital release  · Manual cervical distraction  · Manual side bending, rotation to right  · IASTM to upper trapezius, levator       Treatment/Session Summary:    · Response to Treatment:  Patient has some difficulty avoiding excessive upper trap activity and scapular elevation with pulling activities today but is able to eventually improve form with visual and tactile cues. She reports less pain following rows and foam rolling today. · Communication/Consultation:  None today  · Equipment provided today:  None today  · Recommendations/Intent for next treatment session: Next visit will focus on improving pain, cervical and left UE strength and mobility. Total Treatment Billable Duration:  55 minutes    PT Patient Time In/Time Out  Time In: 4808  Time Out: 507 [facility] Appointments   Date Time Provider Beatriz Gonsalez   6/21/2019  8:00 AM Axel Quinn [facility]   6/25/2019  7:30 AM Ananya Betts [facility]   6/27/2019  7:30 AM Axel Quinn [facility]   11/20/2019  8:30 AM PST LAB [facility] PST PST   12/2/2019  8:20 AM Coit Comment, DO [facility] PST PST

## 2019-06-21 ENCOUNTER — HOSPITAL ENCOUNTER (OUTPATIENT)
Dept: PHYSICAL THERAPY | Age: 54
Discharge: HOME | End: 2019-06-21
Payer: COMMERCIAL

## 2019-06-21 PROCEDURE — 97140 MANUAL THERAPY 1/> REGIONS: CPT

## 2019-06-21 PROCEDURE — 97110 THERAPEUTIC EXERCISES: CPT

## 2019-06-21 NOTE — PROGRESS NOTES
Lori Varghese  : 1965  Primary: Dorethia Skill Of Tab Perez*  Secondary:  [facility] at [facility], [address] [facility]  [phone]   [phone]        OUTPATIENT PHYSICAL THERAPY: Daily Treatment Note 2019  Visit Count:  7    ICD-10: Treatment Diagnosis: Cervicalgia (M54.2), Pain in left shoulder (M25.512), Muscle weakness, generalized (M62.81)  Precautions/Allergies:   Crestor [rosuvastatin]; Levothyroxine; Lisinopril; Lisinopril-hydrochlorothiazide; and Pravastatin   TREATMENT PLAN:  Effective Dates: 2019 TO 2019 (60 days). Frequency/Duration: 2 times a week for 60 Day(s)    Pre-treatment Symptoms/Complaints:  Patient says she felt much better following her last visit. Pain: Initial: Pain Intensity 1: 2/10 Post Session:  0/10   Medications Last Reviewed:  2019  Updated Objective Findings:  None Today  TREATMENT:     Therapeutic Exercise: (30 Minutes):  Exercises per grid below to improve mobility and strength. Required moderate visual, verbal and manual cues to promote proper body alignment, promote proper body posture, promote proper body mechanics and promote proper body breathing techniques. Progressed resistance, range, repetitions and complexity of movement as indicated. Date:  2019     Activity/Exercise Parameters   Patient Education Update HEP   Thoracic extension  Over foam roll, 2 x 10   Pec stretch Foam roll, 2 min   Breathing with reach --   Open book 2 x 10   Cervical SNAG To right, 2 x 10   Rows Low and mid; 2 x 10 each    Foam roll flexion 2 x 10   Prone scapular retraction Bilateral, 2 x 10   Wall slide 2 x 10   Horizontal abduction 2 x 10, orange   Boxes 2 x 10, orange     Manual Therapy (    Soft Tissue Mobilization Duration  Duration: 25 Minutes): Manual techniques to facilitate improved motion and decreased pain.  (Used abbreviations: MET - muscle energy technique; PNF - proprioceptive neuromuscular facilitation; NMR - neuromuscular re-education; a/p - anterior to posterior; p/a - posterior to anterior)   · Suboccipital release  · Manual cervical distraction  · Manual side bending, rotation to right  · IASTM to upper trapezius, levator (not performed today)      Treatment/Session Summary:    · Response to Treatment:  Hair Kennedy has increased tolerance to activity today and reports less scapular discomfort with reaching tasks during session. · Communication/Consultation:  None today  · Equipment provided today:  None today  · Recommendations/Intent for next treatment session: Next visit will focus on improving pain, cervical and left UE strength and mobility. Total Treatment Billable Duration:  55 minutes    PT Patient Time In/Time Out  Time In: 0800  Time Out: 0900  Barbara Owen    Future Appointments   Date Time Provider Beatriz Gonsalez   6/25/2019  7:30 AM [facility]   6/27/2019  7:30 AM [facility]   11/20/2019  8:30 AM PST [facility] PST PST   12/2/2019  8:20 AM DO [place] PST PST

## 2019-06-25 ENCOUNTER — HOSPITAL ENCOUNTER (OUTPATIENT)
Dept: PHYSICAL THERAPY | Age: 54
Discharge: HOME | End: 2019-06-25
Payer: COMMERCIAL

## 2019-06-25 PROCEDURE — 97110 THERAPEUTIC EXERCISES: CPT

## 2019-06-25 PROCEDURE — 97140 MANUAL THERAPY 1/> REGIONS: CPT

## 2019-06-25 NOTE — PROGRESS NOTES
Blessing Dumont  : 1965  Primary: Tommy Mckeon Of Tab Perez*  Secondary:  [facility] at [place] [address], [address] [facility]  [phone]   [phone]        OUTPATIENT PHYSICAL THERAPY: Daily Treatment Note 2019  Visit Count:  8    ICD-10: Treatment Diagnosis: Cervicalgia (M54.2), Pain in left shoulder (M25.512), Muscle weakness, generalized (M62.81)  Precautions/Allergies:   Crestor [rosuvastatin]; Levothyroxine; Lisinopril; Lisinopril-hydrochlorothiazide; and Pravastatin   TREATMENT PLAN:  Effective Dates: 2019 TO 2019 (60 days). Frequency/Duration: 2 times a week for 60 Day(s)    Pre-treatment Symptoms/Complaints:  Patient says she has a headache this morning. Pain: Initial: Pain Intensity 1: 4/10 Post Session:  0/10   Medications Last Reviewed:  2019  Updated Objective Findings:  None Today  TREATMENT:     Therapeutic Exercise: (25 Minutes):  Exercises per grid below to improve mobility and strength. Required moderate visual, verbal and manual cues to promote proper body alignment, promote proper body posture, promote proper body mechanics and promote proper body breathing techniques. Progressed resistance, range, repetitions and complexity of movement as indicated. Date:  2019     Activity/Exercise Parameters   Patient Education Update HEP   Thoracic extension  Over foam roll, 2 x 10   Pec stretch Foam roll, 2 min   Breathing with reach --   Open book --   Cervical SNAG --   Rows Low and mid; 2 x 10 each    Foam roll flexion 2 x 10   Prone scapular retraction --   Wall slide 2 x 10   Horizontal abduction 2 x 10, orange   Boxes 2 x 10, orange   Eyes/Head Orientation 5 min     Manual Therapy (    Soft Tissue Mobilization Duration  Duration: 30 Minutes): Manual techniques to facilitate improved motion and decreased pain.  (Used abbreviations: MET - muscle energy technique; PNF - proprioceptive neuromuscular facilitation; NMR - neuromuscular re-education; a/p - anterior to posterior; p/a - posterior to anterior)   · Suboccipital release  · Manual cervical distraction  · Manual side bending, rotation to right  · IASTM to upper trapezius, levator (not performed today)  · Cranial hold      Treatment/Session Summary:    · Response to Treatment:  Johanna Brown decreased headache and complaints of neck pain following manual guided interoception tasks today. · Communication/Consultation:  None today  · Equipment provided today:  None today  · Recommendations/Intent for next treatment session: Next visit will focus on improving pain, cervical and left UE strength and mobility. Total Treatment Billable Duration:  55 minutes    PT Patient Time In/Time Out  Time In: 0730  Time Out: 0830  Markell Owen    Future Appointments   Date Time Provider Beatriz Gonsalez   6/27/2019  7:30 AM Joe MELENDEZ [facility]   11/20/2019  8:30 AM PST LAB [facility] PST PST   12/2/2019  8:20 AM Catharine Goltz, DO [facility] PST PST

## 2019-06-27 ENCOUNTER — HOSPITAL ENCOUNTER (OUTPATIENT)
Dept: PHYSICAL THERAPY | Age: 54
Discharge: HOME | End: 2019-06-27
Payer: COMMERCIAL

## 2019-06-27 PROCEDURE — 97110 THERAPEUTIC EXERCISES: CPT

## 2019-06-27 PROCEDURE — 97140 MANUAL THERAPY 1/> REGIONS: CPT

## 2019-06-27 NOTE — THERAPY DISCHARGE
Helen Sonu  : 1965  Primary: Phyllistine Vinson Of Tab Perez*  Secondary:  [facility]rapy [facility] at [address]  [phone]   [phone]          OUTPATIENT PHYSICAL THERAPY:Discharge Summary 2019   ICD-10: Treatment Diagnosis: Cervicalgia (M54.2), Pain in left shoulder (M25.512), Muscle weakness, generalized (M62.81)  Precautions/Allergies:   Crestor [rosuvastatin]; Levothyroxine; Lisinopril; Lisinopril-hydrochlorothiazide; and Pravastatin   TREATMENT PLAN:  Effective Dates: 2019 TO 2019 (60 days). Frequency/Duration: 2 times a week for 60 Day(s) MEDICAL/REFERRING DIAGNOSIS:  Pain in left shoulder [M25.512]   DATE OF ONSET: Chronic  REFERRING PHYSICIAN: Mae Monk DO MD Orders: Evaluate and Treat  Return MD Appointment: TBD     INITIAL ASSESSMENT:  Ms. Chencho Herrera presents with chronic history of neck and left shoulder pain. Her chief complaint is pain but also presents with decreased cervical mobility and shoulder strength which limits her ability to perform ADL and work related tasks without pain or difficulty. She will benefit from skilled therapy to improve her pain, strength and mobility to return to prior level of function. 19: Patient wishing to continue with independent HEP at this time. PROBLEM LIST (Impacting functional limitations):  1. Decreased Strength  2. Decreased ADL/Functional Activities  3. Increased Pain  4. Decreased Activity Tolerance  5. Decreased Flexibility/Joint Mobility  6. Decreased [place] with Home Exercise Program INTERVENTIONS PLANNED: (Treatment may consist of any combination of the following)  1. Home Exercise Program (HEP)  2. Manual Therapy  3. Range of Motion (ROM)  4. Therapeutic Exercise/Strengthening     GOALS: (Goals have been discussed and agreed upon with patient.)  Discharge Goals:   1.  Patient will be independent with home exercise program without assistance from therapist. MET  2. Patient will report Quick DASH score to 25/55 or less to demonstrate improved functional capacity. MET  3. Patient will demonstrate pain free lifting with left arm to resume normal work tasks with less difficulty. MET  4. Patient will perform carrying and reaching tasks without complaints of neck or shoulder pain to demonstrate improved functional mobility. MET         OUTCOME MEASURE:   Tool Used: Disabilities of the Arm, Shoulder and Hand (DASH) Questionnaire - Quick Version  Score:  Initial: 34/55 (5/30/19)  Most Recent: 23/55 (Date: 6/27/19)   Interpretation of Score: The DASH is designed to measure the activities of daily living in person's with upper extremity dysfunction or pain. Each section is scored on a 1-5 scale, 5 representing the greatest disability. The scores of each section are added together for a total score of 55. Rehabilitation Potential For Stated Goals: Good       PAIN/SUBJECTIVE:   Initial: Pain Intensity 1: 3 /10 Post Session:  1/10   HISTORY:   History of Injury/Illness (Reason for Referral):  Sudhir Angulo presents with neck and left shoulder pain which has persisted over the past few years. She states she has trouble lifting and carrying things at work, which is very physically demanding. Her goal is to resume normal activities without pain. Past Medical History/Comorbidities:   Ms. Ishan Walker  has a past medical history of B12 deficiency (6/10/2014), Diabetes mellitus, type II ([address] Encounter for long-term (current) use of other medications (10/4/2013), GERD (gastroesophageal reflux disease), HTN (hypertension) (11/19/2012), Hypercholesteremia (11/19/2012), Hyperlipidemia, Hypothyroid (11/19/2012), Menopause, Neuropathic pain, leg, bilateral (4/16/2013), Non compliance w medication regimen (1/26/2015), Palpitations, Shoulder pain, Spondylolisthesis, and Vitamin D deficiency (11/19/2012).   Ms. Ishan Walker  has a past surgical history that includes hx breast reduction (1999). Social History/Living Environment:     Lives in private setting home, no barriers to progress. Prior Level of Function/Work/Activity:  Works in [employer] at [employer]:         RIGHT  Previous Treatment Approaches:          Bout of physical therapy for similar symptoms in 2016   Ambulatory/Rehab Services H2 Model Falls Risk Assessment (5/30/19)   Risk Factors:       No Risk Factors Identified Ability to Rise from Chair:       (0)  Ability to rise in a single movement   Falls Prevention Plan:       No modifications necessary   Total: (5 or greater = High Risk): 0   ©2010 [facility] [address] [place] [address] Patent #[id number]. Federal Law prohibits the replication, distribution or use without written permission from [facility] [employer]   Current Medications:       Current Outpatient Medications:     levothyroxine (SYNTHROID) 112 mcg tablet, Take 1 Tab by mouth Daily (before breakfast). , Disp: 30 Tab, Rfl: 6    metoprolol tartrate (LOPRESSOR) 25 mg tablet, Take 1 Tab by mouth two (2) times a day., Disp: 60 Tab, Rfl: 3    ergocalciferol (ERGOCALCIFEROL) 50,000 unit capsule, Take 1 Cap by mouth every seven (7) days. , Disp: 12 Cap, Rfl: 3    ezetimibe (ZETIA) 10 mg tablet, Take 1 Tab by mouth daily. Indications: high cholesterol, Disp: 90 Tab, Rfl: 3    losartan-hydroCHLOROthiazide (HYZAAR) 100-25 mg per tablet, Take 1 Tab by mouth daily. , Disp: 90 Tab, Rfl: 3    simvastatin (ZOCOR) 20 mg tablet, Take 1 Tab by mouth nightly., Disp: 30 Tab, Rfl: 11    cyclobenzaprine (FLEXERIL) 10 mg tablet, Take 1 Tab by mouth nightly., Disp: 90 Tab, Rfl: 0    naproxen (NAPROSYN) 500 mg tablet, Take 1 Tab by mouth two (2) times daily (with meals). , Disp: 60 Tab, Rfl: 1   Date Last Reviewed:  6/27/2019     Number of Personal Factors/Comorbidities that affect the Plan of Care: 1-2: MODERATE COMPLEXITY   EXAMINATION:   Observation/Orthostatic Postural Assessment:          Patient exhibits mild forward head and rounded shoulders in static sitting postures. Palpation:  Patient is tender to palpation along left levator scapulae, upper trapezius musculature. ROM:   Date: 6/27/19   Flexion WFL   Extension WFL   SBL WFL   SBR WFL   Rot L WFL   Rot R  WFL     Strength:          Gross UE Strength: Right- 5/5, Left- 4/5  Neurological Screen:        Dermatomes:  Pain along C6/7 dermatome        Reflexes:  Normal        Neural Tension Tests:  Normal        Sensation: Normal  Functional Mobility:         Gait/Ambulation:  Normal        Transfers:  Normal            (SB=sidebending, Rot=rotation, TTP=tender to palpation, ULTTA=upper limb tension test-A, UQS=upper quarter scan, WNL=within normal limits; ROM measured in degrees)           Body Structures Involved:  1. Nerves  2. Bones  3. Joints  4. Muscles Body Functions Affected:  1. Sensory/Pain  2. Neuromusculoskeletal  3. Movement Related Activities and Participation Affected:  1. General Tasks and Demands  2. Mobility  3. Self Care  4. Domestic Life  5.  Community, Social and [place]   Number of elements (examined above) that affect the Plan of Care: 4+: HIGH COMPLEXITY   CLINICAL PRESENTATION:   Presentation: Stable and uncomplicated: LOW COMPLEXITY   CLINICAL DECISION MAKING:   Use of outcome tool(s) and clinical judgement create a POC that gives a: Clear prediction of patient's progress: LOW COMPLEXITY

## 2019-06-27 NOTE — PROGRESS NOTES
Roque Nye  : 1965  Primary: Chelsea Briscoe Of Tab Perez*  Secondary:  [facility]rapy Center at [facility], [address] [facility]  [phone]   [phone]        OUTPATIENT PHYSICAL THERAPY: Daily Treatment Note 2019  Visit Count:  9    ICD-10: Treatment Diagnosis: Cervicalgia (M54.2), Pain in left shoulder (M25.512), Muscle weakness, generalized (M62.81)  Precautions/Allergies:   Crestor [rosuvastatin]; Levothyroxine; Lisinopril; Lisinopril-hydrochlorothiazide; and Pravastatin   TREATMENT PLAN:  Effective Dates: 2019 TO 2019 (60 days). Frequency/Duration: 2 times a week for 60 Day(s)    Pre-treatment Symptoms/Complaints:  Patient says her stress has been some better the past few days. Pain: Initial: Pain Intensity 1: 3/10 Post Session:  0/10   Medications Last Reviewed:  2019  Updated Objective Findings:  See discharge summary from today  TREATMENT:     Therapeutic Exercise: (30 Minutes):  Exercises per grid below to improve mobility and strength. Required moderate visual, verbal and manual cues to promote proper body alignment, promote proper body posture, promote proper body mechanics and promote proper body breathing techniques. Progressed resistance, range, repetitions and complexity of movement as indicated. Date:  2019     Activity/Exercise Parameters   Patient Education Update HEP, goals   Thoracic extension  Over foam roll, 2 x 10   Pec stretch Foam roll, 2 min   Breathing with reach --   Open book 2 x 10   Cervical SNAG --   Rows Low and mid; 2 x 10 each    Foam roll flexion 2 x 10   Prone scapular retraction --   Wall slide 2 x 10   Horizontal abduction 2 x 10, orange   Boxes 2 x 10, orange   Eyes/Head Orientation 5 min     Manual Therapy (    Soft Tissue Mobilization Duration  Duration: 25 Minutes): Manual techniques to facilitate improved motion and decreased pain.  (Used abbreviations: MET - muscle energy technique; PNF - proprioceptive neuromuscular facilitation; NMR - neuromuscular re-education; a/p - anterior to posterior; p/a - posterior to anterior)   · Suboccipital release  · Manual cervical distraction  · Manual side bending, rotation to right  · IASTM to upper trapezius, levator (not performed today)  · Cranial hold      Treatment/Session Summary:    · Response to Treatment:  Crystal Thibodeaux has met all goals and will continue with independent HEP at this time. · Communication/Consultation:  None today  · Equipment provided today:  None today  · Recommendations: Discharge from physical therapy. Total Treatment Billable Duration:  55 minutes    PT Patient Time In/Time Out  Time In: 0730  Time Out: 0830  Markell Owen    Future Appointments   Date Time Provider Beatriz Gonsalez   11/20/2019  8:30 AM PST LAB [facility]   12/2/2019  8:20 AM Rosa Redman DO [facility] PST

## 2019-10-28 ENCOUNTER — HOSPITAL ENCOUNTER (OUTPATIENT)
Dept: MAMMOGRAPHY | Age: 54
Discharge: HOME | End: 2019-10-28
Attending: FAMILY MEDICINE
Payer: COMMERCIAL

## 2019-10-28 DIAGNOSIS — Z12.31 VISIT FOR SCREENING MAMMOGRAM: ICD-10-CM

## 2019-10-28 PROCEDURE — 77067 SCR MAMMO BI INCL CAD: CPT

## 2019-12-02 PROBLEM — G44.221 CHRONIC TENSION-TYPE HEADACHE, INTRACTABLE: Status: ACTIVE | Noted: 2019-12-02

## 2019-12-02 PROBLEM — K21.00 GASTROESOPHAGEAL REFLUX DISEASE WITH ESOPHAGITIS: Status: ACTIVE | Noted: 2019-12-02

## 2019-12-02 PROBLEM — J30.89 ENVIRONMENTAL AND SEASONAL ALLERGIES: Status: ACTIVE | Noted: 2019-12-02

## 2020-10-29 ENCOUNTER — HOSPITAL ENCOUNTER (OUTPATIENT)
Dept: MAMMOGRAPHY | Age: 55
Discharge: HOME | End: 2020-10-29
Attending: FAMILY MEDICINE
Payer: COMMERCIAL

## 2020-10-29 DIAGNOSIS — Z12.31 SCREENING MAMMOGRAM FOR HIGH-RISK PATIENT: ICD-10-CM

## 2020-10-29 PROCEDURE — 77067 SCR MAMMO BI INCL CAD: CPT

## 2021-02-11 ENCOUNTER — ANESTHESIA EVENT (OUTPATIENT)
Dept: ENDOSCOPY | Age: 56
End: 2021-02-11
Payer: COMMERCIAL

## 2021-02-11 RX ORDER — SODIUM CHLORIDE, SODIUM LACTATE, POTASSIUM CHLORIDE, CALCIUM CHLORIDE 600; 310; 30; 20 MG/100ML; MG/100ML; MG/100ML; MG/100ML
100 INJECTION, SOLUTION INTRAVENOUS CONTINUOUS
Status: CANCELLED | OUTPATIENT
Start: 2021-02-11

## 2021-02-12 ENCOUNTER — HOSPITAL ENCOUNTER (OUTPATIENT)
Age: 56
Setting detail: OUTPATIENT SURGERY
Discharge: HOME | End: 2021-02-12
Attending: SURGERY | Admitting: SURGERY
Payer: COMMERCIAL

## 2021-02-12 ENCOUNTER — ANESTHESIA (OUTPATIENT)
Dept: ENDOSCOPY | Age: 56
End: 2021-02-12
Payer: COMMERCIAL

## 2021-02-12 VITALS
SYSTOLIC BLOOD PRESSURE: 131 MMHG | RESPIRATION RATE: 16 BRPM | DIASTOLIC BLOOD PRESSURE: 81 MMHG | HEART RATE: 75 BPM | OXYGEN SATURATION: 94 % | TEMPERATURE: 96.8 F

## 2021-02-12 DIAGNOSIS — Z12.11 COLON CANCER SCREENING: ICD-10-CM

## 2021-02-12 DIAGNOSIS — K57.30 SIGMOID DIVERTICULOSIS: ICD-10-CM

## 2021-02-12 LAB — GLUCOSE BLD STRIP.AUTO-MCNC: 130 MG/DL (ref 65–100)

## 2021-02-12 PROCEDURE — 76060000031 HC ANESTHESIA FIRST 0.5 HR: Performed by: SURGERY

## 2021-02-12 PROCEDURE — 74011250636 HC RX REV CODE- 250/636: Performed by: ANESTHESIOLOGY

## 2021-02-12 PROCEDURE — 74011000250 HC RX REV CODE- 250: Performed by: ANESTHESIOLOGY

## 2021-02-12 PROCEDURE — 76040000025: Performed by: SURGERY

## 2021-02-12 PROCEDURE — 74011000250 HC RX REV CODE- 250: Performed by: REGISTERED NURSE

## 2021-02-12 PROCEDURE — 82962 GLUCOSE BLOOD TEST: CPT

## 2021-02-12 PROCEDURE — 45378 DIAGNOSTIC COLONOSCOPY: CPT | Performed by: SURGERY

## 2021-02-12 PROCEDURE — 74011250636 HC RX REV CODE- 250/636: Performed by: REGISTERED NURSE

## 2021-02-12 PROCEDURE — 2709999900 HC NON-CHARGEABLE SUPPLY: Performed by: SURGERY

## 2021-02-12 RX ORDER — LIDOCAINE HYDROCHLORIDE 20 MG/ML
INJECTION, SOLUTION EPIDURAL; INFILTRATION; INTRACAUDAL; PERINEURAL AS NEEDED
Status: DISCONTINUED | OUTPATIENT
Start: 2021-02-12 | End: 2021-02-12 | Stop reason: HOSPADM

## 2021-02-12 RX ORDER — PROPOFOL 10 MG/ML
INJECTION, EMULSION INTRAVENOUS AS NEEDED
Status: DISCONTINUED | OUTPATIENT
Start: 2021-02-12 | End: 2021-02-12 | Stop reason: HOSPADM

## 2021-02-12 RX ORDER — FAMOTIDINE 20 MG/1
20 TABLET, FILM COATED ORAL AS NEEDED
Status: DISCONTINUED | OUTPATIENT
Start: 2021-02-12 | End: 2021-02-12 | Stop reason: HOSPADM

## 2021-02-12 RX ORDER — SODIUM CHLORIDE, SODIUM LACTATE, POTASSIUM CHLORIDE, CALCIUM CHLORIDE 600; 310; 30; 20 MG/100ML; MG/100ML; MG/100ML; MG/100ML
100 INJECTION, SOLUTION INTRAVENOUS CONTINUOUS
Status: DISCONTINUED | OUTPATIENT
Start: 2021-02-12 | End: 2021-02-12 | Stop reason: HOSPADM

## 2021-02-12 RX ORDER — PROPOFOL 10 MG/ML
INJECTION, EMULSION INTRAVENOUS
Status: DISCONTINUED | OUTPATIENT
Start: 2021-02-12 | End: 2021-02-12 | Stop reason: HOSPADM

## 2021-02-12 RX ADMIN — PROPOFOL 160 MCG/KG/MIN: 10 INJECTION, EMULSION INTRAVENOUS at 08:13

## 2021-02-12 RX ADMIN — FAMOTIDINE 20 MG: 10 INJECTION INTRAVENOUS at 07:30

## 2021-02-12 RX ADMIN — PROPOFOL 50 MG: 10 INJECTION, EMULSION INTRAVENOUS at 08:13

## 2021-02-12 RX ADMIN — LIDOCAINE HYDROCHLORIDE 40 MG: 20 INJECTION, SOLUTION EPIDURAL; INFILTRATION; INTRACAUDAL; PERINEURAL at 08:13

## 2021-02-12 RX ADMIN — SODIUM CHLORIDE, SODIUM LACTATE, POTASSIUM CHLORIDE, AND CALCIUM CHLORIDE 100 ML/HR: 600; 310; 30; 20 INJECTION, SOLUTION INTRAVENOUS at 07:30

## 2021-02-12 NOTE — PROGRESS NOTES
Pregnancy refusal signed and placed in chart    Patient states she does not need a . Waiver signed and is in chart.

## 2021-02-12 NOTE — PROGRESS NOTES
Prayer provided during Pre-op as requested by the the patient.       Issa Andrade, [address] [facility], [facility]

## 2021-02-12 NOTE — ANESTHESIA PREPROCEDURE EVALUATION
Relevant Problems   NEUROLOGY   (+) Chronic tension-type headache, intractable      CARDIOVASCULAR   (+) HTN (hypertension)      GASTROINTESTINAL   (+) GERD (gastroesophageal reflux disease)   (+) Gastroesophageal reflux disease with esophagitis      ENDOCRINE   (+) Diabetes mellitus, type II (HCC)   (+) Hypothyroid   (+) Type 2 diabetes mellitus with diabetic neuropathy (HCC)       Anesthetic History   No history of anesthetic complications            Review of Systems / Medical History  Patient summary reviewed and pertinent labs reviewed    Pulmonary  Within defined limits                 Neuro/Psych   Within defined limits           Cardiovascular    Hypertension: well controlled          Hyperlipidemia  Pertinent negatives: Valvular problems/murmurs: borderline.   Exercise tolerance: >4 METS     GI/Hepatic/Renal     GERD: well controlled           Endo/Other    Diabetes  Hypothyroidism: well controlled       Other Findings              Physical Exam    Airway  Mallampati: II  TM Distance: 4 - 6 cm  Neck ROM: normal range of motion   Mouth opening: Normal     Cardiovascular  Regular rate and rhythm,  S1 and S2 normal,  no murmur, click, rub, or gallop  Rhythm: regular  Rate: normal         Dental  No notable dental hx       Pulmonary  Breath sounds clear to auscultation               Abdominal         Other Findings            Anesthetic Plan    ASA: 2  Anesthesia type: total IV anesthesia          Induction: Intravenous  Anesthetic plan and risks discussed with: Patient

## 2021-02-12 NOTE — ANESTHESIA POSTPROCEDURE EVALUATION
Procedure(s):  COLONOSCOPY/BMI 27. total IV anesthesia    Anesthesia Post Evaluation      Multimodal analgesia: multimodal analgesia not used between 6 hours prior to anesthesia start to PACU discharge  Patient location during evaluation: bedside  Patient participation: complete - patient participated  Level of consciousness: awake and alert  Pain management: adequate  Airway patency: patent  Anesthetic complications: no  Cardiovascular status: hemodynamically stable  Respiratory status: spontaneous ventilation  Hydration status: euvolemic  Comments: Patient stable and may discharge at this time. Post anesthesia nausea and vomiting:  none  Final Post Anesthesia Temperature Assessment:  Normothermia (36.0-37.5 degrees C)      INITIAL Post-op Vital signs:   Vitals Value Taken Time   /76 02/12/21 0853   Temp 36 °C (96.8 °F) 02/12/21 0834   Pulse 72 02/12/21 0853   Resp 16 02/12/21 0843   SpO2 95 % 02/12/21 0853   Vitals shown include unvalidated device data.

## 2021-02-12 NOTE — ROUTINE PROCESS
Patient denies any needs. Vital signs stable. Discharge instructions given to patient and . No other concerns noted at this time. Patient wheeled out to car via wheelchair with staff. Discharge instructions signed and placed in chart.

## 2021-02-12 NOTE — PROCEDURES
Procedure in Detail:  Informed consent was obtained for the procedure. The patient was placed in the left lateral decubitus position and sedation was induced by anesthesia. The scope was inserted into the rectum and advanced under direct vision to the cecum, which was identified by the ileocecal valve and appendiceal orifice. The quality of the colonic preparation was excellent. A careful inspection was made as the colonoscope was withdrawn, including a retroflexed view of the rectum; findings and interventions are described below. Appropriate photodocumentation was obtained. Findings:   ANUS: Anal exam reveals no masses or hemorrhoids, sphincter tone is normal.   RECTUM: Rectal exam reveals no masses or hemorrhoids. SIGMOID COLON: The mucosa is normal. There is mild diverticulosis and tortuosity  DESCENDING COLON: The mucosa is normal with good vascular pattern and without ulcers, diverticula, and polyps. SPLENIC FLEXURE: The splenic flexure is normal.   TRANSVERSE COLON: The mucosa is normal with good vascular pattern and without ulcers, diverticula, and polyps. HEPATIC FLEXURE: The hepatic flexure is normal.   ASCENDING COLON: The mucosa is normal with good vascular pattern and without ulcers, diverticula, and polyps. CECUM: The appendiceal orifice appears normal. The ileocecal valve appears normal.   TERMINAL ILEUM: The terminal ileum was not entered. Specimens: No specimens were collected. Complications: None; patient tolerated the procedure well. \    EBL - none    Recommendations:   - For colon cancer screening in this average-risk patient, colonoscopy may be repeated in 10 years.      Signed By: Sherif White MD                        February 12, 2021

## 2021-02-12 NOTE — H&P
History and Physical      Patient: Mylene Sjogren    Physician: Pearl Ascencio MD    Referring Physician: Arnaud Soto DO    Chief Complaint: For colonoscopy    History of Present Illness: Pt presents for colonoscopy. Initial screening. History:  Past Medical History:   Diagnosis Date    B12 deficiency 6/10/2014    Diabetes mellitus, type II ([address]     \"pre-diabetic\" per patient. no meds. Avg. fbs 100-115. denies hypo.   Last HA1C was 6.5 on 11/2020    Encounter for long-term (current) use of other medications 10/4/2013    GERD (gastroesophageal reflux disease)     no meds    HTN (hypertension) 11/19/2012    Hypercholesteremia 11/19/2012    Hyperlipidemia     Hypothyroid 11/19/2012    Menopause     Neuropathic pain, leg, bilateral 4/16/2013    Non compliance w medication regimen 1/26/2015    Palpitations     evaluated by [address] [facility] 2018  (\"related to dose of thyroid medication)    Psychiatric disorder     job related stress    Shoulder pain     chronic    Spondylolisthesis     Vitamin D deficiency 11/19/2012     Past Surgical History:   Procedure Laterality Date    HX BREAST REDUCTION  1999      Social History     Socioeconomic History    Marital status:      Spouse name: Not on file    Number of children: Not on file    Years of education: Not on file    Highest education level: Not on file   Tobacco Use    Smoking status: Never Smoker    Smokeless tobacco: Never Used   Substance and Sexual Activity    Alcohol use: Not Currently     Alcohol/week: 0.0 standard drinks     Comment: social    Drug use: Never      Family History   Problem Relation Age of Onset    Elevated Lipids Father     Cancer Father         Liver    Alcohol abuse Father     Heart Disease Mother     High Cholesterol Mother     Hypertension Mother     Stroke Mother     Cancer Mother         Uterine    Coronary Artery Disease Neg Hx     Breast Cancer Neg Hx        Medications:   Prior to Admission medications    Medication Sig Start Date End Date Taking? Authorizing Provider   losartan-hydroCHLOROthiazide (HYZAAR) 100-25 mg per tablet Take 1 Tab by mouth daily. 2/1/21  Yes Rosemary WAITE DO   fenofibrate (LOFIBRA) 160 mg tablet Take 1 Tab by mouth daily. 2/1/21  Yes Rosemary WAITE DO   levothyroxine (SYNTHROID) 112 mcg tablet Take 1 Tab by mouth Daily (before breakfast). 12/11/20  Yes Rosemary WAITE DO   ergocalciferol (ERGOCALCIFEROL) 1,250 mcg (50,000 unit) capsule Take 1 Cap by mouth every seven (7) days. 12/9/20  Yes Santo Fernandez DO   XIIDRA 5 % dpet 1 Drop two (2) times a day. One drop in each eye twice daily 10/25/19  Yes Provider, Historical   naproxen (NAPROSYN) 500 mg tablet Take 1 Tab by mouth two (2) times daily (with meals). Patient taking differently: Take 500 mg by mouth two (2) times daily (with meals). As needed 12/2/19  Yes Rosemary WAITE DO   cyclobenzaprine (FLEXERIL) 10 mg tablet Take 1 Tab by mouth nightly. Patient taking differently: Take 10 mg by mouth nightly as needed. 12/3/18  Yes Rosemary WAITE DO       Allergies: Allergies   Allergen Reactions    Crestor [Rosuvastatin] Myalgia    Levothyroxine Palpitations and Cough    Lisinopril Cough    Lisinopril-Hydrochlorothiazide Cough     Persistent     Pravastatin Myalgia       Physical Exam:     Vital Signs:   Visit Vitals  /85   Pulse 81   Temp 98.2 °F (36.8 °C)   Resp 16   SpO2 95%     . General: no distress      Heart: regular   Lungs: unlabored   Abdominal: soft   Neurological: Grossly normal        Findings/Diagnosis: Screening for colorectal cancer     Plan of Care/Planned Procedure: Colonoscopy, possible polypectomy. Pt/designee has reviewed the colonoscopy information sheet. Any questions have been discussed. They agree to proceed.       Signed:  Zahraa Plascencia MD   2/12/2021

## 2021-02-12 NOTE — DISCHARGE INSTRUCTIONS
Gastrointestinal Colonoscopy/Flexible Sigmoidoscopy - Lower Exam Discharge Instructions  1. Call Dr. Lainey Riggs for any problems or questions. 2. Contact the doctors office for follow up appointment as directed  3. Medication may cause drowsiness for several hours, therefore:  · Do not drive or operate machinery for reminder of the day. · No alcohol today. · Do not make any important or legal decisions for 24 hours. · Do not sign any legal documents for 24 hours. 4. Ordinarily, you may resume regular diet and activity after exam unless otherwise specified by your physician. 5. Because of air put into your colon during exam, you may experience some abdominal distension, relieved by the passage of gas, for several hours. 6. Contact your physician if you have any of the following:  · Excessive amount of bleeding - large amount when having a bowel movement. Occasional specks of blood with bowel movement would not be unusual.  · Severe abdominal pain  · Fever or Chills    Instructions given to Lorenzo Johnson and other family members. Patient Education        Diverticulosis: Care Instructions  Your Care Instructions  In diverticulosis, pouches called diverticula form in the wall of the large intestine (colon). The pouches do not cause any pain or other symptoms. Most people who have diverticulosis do not know they have it. But the pouches sometimes bleed, and if they become infected, they can cause pain and other symptoms. When this happens, it is called diverticulitis. Diverticula form when pressure pushes the wall of the colon outward at certain weak points. A diet that is too low in fiber can cause diverticula. Follow-up care is a key part of your treatment and safety. Be sure to make and go to all appointments, and call your doctor if you are having problems. It's also a good idea to know your test results and keep a list of the medicines you take. How can you care for yourself at home?   · Include fruits, leafy green vegetables, beans, and whole grains in your diet each day. These foods are high in fiber. · Take a fiber supplement, such as Citrucel or Metamucil, every day if needed. Read and follow all instructions on the label. · Drink plenty of fluids, enough so that your urine is light yellow or clear like water. If you have kidney, heart, or liver disease and have to limit fluids, talk with your doctor before you increase the amount of fluids you drink. · Get at least 30 minutes of exercise on most days of the week. Walking is a good choice. You also may want to do other activities, such as running, swimming, cycling, or playing tennis or team sports. · Cut out foods that cause gas, pain, or other symptoms. When should you call for help? Call your doctor now or seek immediate medical care if:    · You have belly pain.     · You pass maroon or very bloody stools.     · You have a fever.     · You have nausea and vomiting.     · You have unusual changes in your bowel movements or abdominal swelling.     · You have burning pain when you urinate.     · You have abnormal vaginal discharge.     · You have shoulder pain.     · You have cramping pain that does not get better when you have a bowel movement or pass gas.     · You pass gas or stool from your urethra while urinating. Watch closely for changes in your health, and be sure to contact your doctor if you have any problems. Where can you learn more? Go to http://www.gray.com/  Enter [id number] in the search box to learn more about \"Diverticulosis: Care Instructions. \"  Current as of: April 15, 2020               Content Version: 12.6  © 3936-5382 [employer]. Care instructions adapted under license by [employer] (which disclaims liability or warranty for this information).  If you have questions about a medical condition or this instruction, always ask your healthcare professional. [employer] disclaims any warranty or liability for your use of this information.

## 2021-10-06 ENCOUNTER — TRANSCRIBE ORDER (OUTPATIENT)
Dept: REGISTRATION | Age: 56
End: 2021-10-06

## 2021-10-06 DIAGNOSIS — Z12.31 SCREENING MAMMOGRAM FOR HIGH-RISK PATIENT: Primary | ICD-10-CM

## 2021-11-02 ENCOUNTER — HOSPITAL ENCOUNTER (OUTPATIENT)
Dept: MAMMOGRAPHY | Age: 56
Discharge: HOME | End: 2021-11-02
Attending: FAMILY MEDICINE
Payer: COMMERCIAL

## 2021-11-02 DIAGNOSIS — Z12.31 SCREENING MAMMOGRAM FOR HIGH-RISK PATIENT: ICD-10-CM

## 2021-11-02 PROCEDURE — 77067 SCR MAMMO BI INCL CAD: CPT

## 2022-03-18 PROBLEM — K21.00 GASTROESOPHAGEAL REFLUX DISEASE WITH ESOPHAGITIS: Status: ACTIVE | Noted: 2019-12-02

## 2022-03-19 PROBLEM — G44.221 CHRONIC TENSION-TYPE HEADACHE, INTRACTABLE: Status: ACTIVE | Noted: 2019-12-02

## 2022-03-19 PROBLEM — R19.7 DIARRHEA: Status: ACTIVE | Noted: 2018-12-03

## 2022-03-19 PROBLEM — M43.16 SPONDYLOLISTHESIS OF LUMBAR REGION: Status: ACTIVE | Noted: 2017-05-23

## 2022-03-19 PROBLEM — E11.40 TYPE 2 DIABETES MELLITUS WITH DIABETIC NEUROPATHY (HCC): Status: ACTIVE | Noted: 2018-12-03

## 2022-03-19 PROBLEM — R10.84 ABDOMINAL PAIN, GENERALIZED: Status: ACTIVE | Noted: 2018-12-03

## 2022-03-19 PROBLEM — R00.2 HEART PALPITATIONS: Status: ACTIVE | Noted: 2018-08-14

## 2022-03-19 PROBLEM — J30.89 ENVIRONMENTAL AND SEASONAL ALLERGIES: Status: ACTIVE | Noted: 2019-12-02

## 2022-08-01 ENCOUNTER — NURSE ONLY (OUTPATIENT)
Dept: FAMILY MEDICINE CLINIC | Facility: CLINIC | Age: 57
End: 2022-08-01

## 2022-08-01 DIAGNOSIS — E03.9 ACQUIRED HYPOTHYROIDISM: ICD-10-CM

## 2022-08-01 DIAGNOSIS — E11.40 TYPE 2 DIABETES MELLITUS WITH DIABETIC NEUROPATHY, WITHOUT LONG-TERM CURRENT USE OF INSULIN (HCC): Primary | ICD-10-CM

## 2022-08-01 DIAGNOSIS — E78.00 HYPERCHOLESTEREMIA: ICD-10-CM

## 2022-08-01 DIAGNOSIS — I10 PRIMARY HYPERTENSION: ICD-10-CM

## 2022-08-01 LAB
ALBUMIN SERPL-MCNC: 4 G/DL (ref 3.5–5)
ALBUMIN/GLOB SERPL: 1.2 {RATIO} (ref 1.2–3.5)
ALP SERPL-CCNC: 113 U/L (ref 50–136)
ALT SERPL-CCNC: 36 U/L (ref 12–65)
ANION GAP SERPL CALC-SCNC: 4 MMOL/L (ref 7–16)
AST SERPL-CCNC: 17 U/L (ref 15–37)
BILIRUB SERPL-MCNC: 0.3 MG/DL (ref 0.2–1.1)
BUN SERPL-MCNC: 16 MG/DL (ref 6–23)
CALCIUM SERPL-MCNC: 9.2 MG/DL (ref 8.3–10.4)
CHLORIDE SERPL-SCNC: 104 MMOL/L (ref 98–107)
CHOLEST SERPL-MCNC: 245 MG/DL
CO2 SERPL-SCNC: 30 MMOL/L (ref 21–32)
CREAT SERPL-MCNC: 0.7 MG/DL (ref 0.6–1)
EST. AVERAGE GLUCOSE BLD GHB EST-MCNC: 163 MG/DL
GLOBULIN SER CALC-MCNC: 3.3 G/DL (ref 2.3–3.5)
GLUCOSE SERPL-MCNC: 148 MG/DL (ref 65–100)
HBA1C MFR BLD: 7.3 % (ref 4.8–5.6)
HDLC SERPL-MCNC: 40 MG/DL (ref 40–60)
HDLC SERPL: 6.1 {RATIO}
LDLC SERPL CALC-MCNC: 126.8 MG/DL
POTASSIUM SERPL-SCNC: 4.2 MMOL/L (ref 3.5–5.1)
PROT SERPL-MCNC: 7.3 G/DL (ref 6.3–8.2)
SODIUM SERPL-SCNC: 138 MMOL/L (ref 136–145)
TRIGL SERPL-MCNC: 391 MG/DL (ref 35–150)
TSH, 3RD GENERATION: 0.54 UIU/ML (ref 0.36–3.74)
VLDLC SERPL CALC-MCNC: 78.2 MG/DL (ref 6–23)

## 2022-08-09 ENCOUNTER — TELEMEDICINE (OUTPATIENT)
Dept: FAMILY MEDICINE CLINIC | Facility: CLINIC | Age: 57
End: 2022-08-09
Payer: COMMERCIAL

## 2022-08-09 DIAGNOSIS — E03.9 HYPOTHYROIDISM, UNSPECIFIED TYPE: ICD-10-CM

## 2022-08-09 DIAGNOSIS — I10 PRIMARY HYPERTENSION: Primary | ICD-10-CM

## 2022-08-09 DIAGNOSIS — E55.9 VITAMIN D DEFICIENCY: ICD-10-CM

## 2022-08-09 DIAGNOSIS — E78.00 HYPERCHOLESTEREMIA: ICD-10-CM

## 2022-08-09 PROCEDURE — 99443 PR PHYS/QHP TELEPHONE EVALUATION 21-30 MIN: CPT | Performed by: FAMILY MEDICINE

## 2022-08-09 RX ORDER — SIMVASTATIN 20 MG
20 TABLET ORAL NIGHTLY
Qty: 90 TABLET | Refills: 2 | Status: CANCELLED | OUTPATIENT
Start: 2022-08-09

## 2022-08-09 RX ORDER — LOSARTAN POTASSIUM AND HYDROCHLOROTHIAZIDE 25; 100 MG/1; MG/1
1 TABLET ORAL DAILY
Qty: 90 TABLET | Refills: 2 | Status: SHIPPED | OUTPATIENT
Start: 2022-08-09

## 2022-08-09 RX ORDER — FENOFIBRATE 160 MG/1
160 TABLET ORAL DAILY
Qty: 90 TABLET | Refills: 1 | Status: SHIPPED | OUTPATIENT
Start: 2022-08-09 | End: 2022-08-10 | Stop reason: SINTOL

## 2022-08-09 RX ORDER — LEVOTHYROXINE SODIUM 112 UG/1
112 TABLET ORAL
Qty: 90 TABLET | Refills: 2 | Status: SHIPPED | OUTPATIENT
Start: 2022-08-09

## 2022-08-09 RX ORDER — ERGOCALCIFEROL (VITAMIN D2) 1250 MCG
50000 CAPSULE ORAL
Qty: 12 CAPSULE | Refills: 2 | Status: SHIPPED | OUTPATIENT
Start: 2022-08-09 | End: 2022-09-27 | Stop reason: SDUPTHER

## 2022-08-09 RX ORDER — ATORVASTATIN CALCIUM 20 MG/1
20 TABLET, FILM COATED ORAL DAILY
Qty: 30 TABLET | Refills: 3 | Status: SHIPPED | OUTPATIENT
Start: 2022-08-09 | End: 2022-09-27 | Stop reason: SDUPTHER

## 2022-08-09 ASSESSMENT — ENCOUNTER SYMPTOMS
SHORTNESS OF BREATH: 0
NAUSEA: 0
VOMITING: 0

## 2022-08-09 ASSESSMENT — PATIENT HEALTH QUESTIONNAIRE - PHQ9
SUM OF ALL RESPONSES TO PHQ QUESTIONS 1-9: 0
SUM OF ALL RESPONSES TO PHQ9 QUESTIONS 1 & 2: 0
SUM OF ALL RESPONSES TO PHQ QUESTIONS 1-9: 0
2. FEELING DOWN, DEPRESSED OR HOPELESS: 0
1. LITTLE INTEREST OR PLEASURE IN DOING THINGS: 0

## 2022-08-09 NOTE — PROGRESS NOTES
PROGRESS NOTE  This visit was conducted via the phone with patient's consent to the visit and all associated charges to reduce the patient's risk of exposure to COVID-19 and continuity of care for an established patient. She and/or her healthcare decision maker is aware that this patient-initiated phone encounter is a billable service, with coverage as determined by her insurance carrier. She is aware that she may receive a bill and has provided verbal consent to proceed: Yes    Vitals and physical exam deferred due to telephone visit. Total Time: 21-30 minutes    SUBJECTIVE:   Panfilo Gallardo is a 65 y.o. female seen for a follow up visit regarding the following chief complaint:             HPI patient doing a phone call visit to go over multiple problems/lab work to be seen in the office as soon as possible      Past Medical History, Past Surgical History, Family history, Social History, and Medications were all reviewed with the patient today and updated as necessary. Current Outpatient Medications   Medication Sig Dispense Refill    levothyroxine (SYNTHROID) 112 MCG tablet Take 1 tablet by mouth every morning (before breakfast) 90 tablet 2    losartan-hydroCHLOROthiazide (HYZAAR) 100-25 MG per tablet Take 1 tablet by mouth in the morning. 90 tablet 2    ergocalciferol (ERGOCALCIFEROL) 1.25 MG (47205 UT) capsule Take 1 capsule by mouth every 7 days 12 capsule 2    fenofibrate (TRIGLIDE) 160 MG tablet Take 1 tablet by mouth in the morning. 90 tablet 1    atorvastatin (LIPITOR) 20 MG tablet Take 1 tablet by mouth in the morning.  30 tablet 3    Bempedoic Acid-Ezetimibe (NEXLIZET) 180-10 MG TABS Take 1 tablet by mouth daily      cyclobenzaprine (FLEXERIL) 10 MG tablet Take 10 mg by mouth      conjugated estrogens (PREMARIN) 0.625 MG/GM vaginal cream Place 0.5 g vaginally daily      fenofibrate (TRIGLIDE) 160 MG tablet Take 160 mg by mouth daily      Lifitegrast (XIIDRA) 5 % SOLN 1 drop 2 times daily mometasone (ELOCON) 0.1 % cream Apply topically daily      naproxen (NAPROSYN) 500 MG tablet Take 500 mg by mouth 2 times daily (with meals)       No current facility-administered medications for this visit. Allergies   Allergen Reactions    Lisinopril Other (See Comments)    Lisinopril-Hydrochlorothiazide Other (See Comments)     Persistent     Pravastatin Other (See Comments)    Rosuvastatin Other (See Comments)    Levothyroxine Other (See Comments) and Palpitations     Patient Active Problem List   Diagnosis    Gastroesophageal reflux disease with esophagitis    Spondylolisthesis of lumbar region    Diabetes mellitus, type II (HCC)    Type 2 diabetes mellitus with diabetic neuropathy (HCC)    Diarrhea    Hypercholesteremia    Heart palpitations    Abdominal pain, generalized    Encounter for long-term (current) use of other medications    Chronic tension-type headache, intractable    Neuropathic pain, leg, bilateral    Environmental and seasonal allergies    B12 deficiency    GERD (gastroesophageal reflux disease)    Vitamin D deficiency    HTN (hypertension)    Non compliance w medication regimen    Hypothyroid     Past Medical History:   Diagnosis Date    B12 deficiency 6/10/2014    Diabetes mellitus, type II ([facility] [address]     \"pre-diabetic\" per patient. no meds. Avg. fbs 100-115. denies hypo.   Last HA1C was 6.5 on 11/2020    Encounter for long-term (current) use of other medications 10/4/2013    GERD (gastroesophageal reflux disease)     no meds    HTN (hypertension) 11/19/2012    Hypercholesteremia 11/19/2012    Hyperlipidemia     Hypothyroid 11/19/2012    Menopause     Neuropathic pain, leg, bilateral 4/16/2013    Non compliance w medication regimen 1/26/2015    Palpitations     evaluated by [facility] 2018  (\"related to dose of thyroid medication)    Psychiatric disorder     job related stress    Shoulder pain     chronic    Spondylolisthesis     Vitamin D deficiency 11/19/2012     Past Surgical History:   Procedure Laterality Date    BREAST REDUCTION SURGERY  1999    COLONOSCOPY N/A 2/12/2021    COLONOSCOPY/BMI 27 performed by Jen Zambrano MD at [facility] ENDOSCOPY    COLONOSCOPY FLX DX W/COLLJ SPEC WHEN PFRMD  2/12/2021          Family History   Problem Relation Age of Onset    Heart Disease Mother     Alcohol Abuse Father     Cancer Father         Liver    Elevated Lipids Father     High Cholesterol Mother     Breast Cancer Neg Hx     Coronary Art Dis Neg Hx     Cancer Mother         Uterine    Stroke Mother     Hypertension Mother      Social History     Tobacco Use    Smoking status: Never    Smokeless tobacco: Never   Substance Use Topics    Alcohol use: Not Currently     Alcohol/week: 0.0 standard drinks         Review of Systems   Constitutional:  Negative for fatigue and fever. Respiratory:  Negative for shortness of breath. Cardiovascular:  Negative for chest pain. Gastrointestinal:  Negative for nausea and vomiting. OBJECTIVE:  There were no vitals taken for this visit. Physical Exam     Medical problems and test results were reviewed with the patient today.      Recent Results (from the past 672 hour(s))   Lipid Panel    Collection Time: 08/01/22  8:11 AM   Result Value Ref Range    Cholesterol, Total 245 (H) <200 MG/DL    Triglycerides 391 (H) 35 - 150 MG/DL    HDL 40 40 - 60 MG/DL    LDL Calculated 126.8 (H) <100 MG/DL    VLDL Cholesterol Calculated 78.2 (H) 6.0 - 23.0 MG/DL    Chol/HDL Ratio 6.1     Hemoglobin A1C    Collection Time: 08/01/22  8:11 AM   Result Value Ref Range    Hemoglobin A1C 7.3 (H) 4.8 - 5.6 %    eAG 163 mg/dL   TSH    Collection Time: 08/01/22  8:11 AM   Result Value Ref Range    TSH, 3RD GENERATION 0.537 0.358 - 3.740 uIU/mL   Comprehensive Metabolic Panel    Collection Time: 08/01/22  8:11 AM   Result Value Ref Range    Sodium 138 136 - 145 mmol/L    Potassium 4.2 3.5 - 5.1 mmol/L    Chloride 104 98 - 107 mmol/L    CO2 30 21 - 32 mmol/L    Anion Gap 4 (L) 7 - 16 mmol/L    Glucose 148 (H) 65 - 100 mg/dL    BUN 16 6 - 23 MG/DL    Creatinine 0.70 0.6 - 1.0 MG/DL    GFR African American >60 >60 ml/min/1.73m2    GFR Non- >60 >60 ml/min/1.73m2    Calcium 9.2 8.3 - 10.4 MG/DL    Total Bilirubin 0.3 0.2 - 1.1 MG/DL    ALT 36 12 - 65 U/L    AST 17 15 - 37 U/L    Alk Phosphatase 113 50 - 136 U/L    Total Protein 7.3 6.3 - 8.2 g/dL    Albumin 4.0 3.5 - 5.0 g/dL    Globulin 3.3 2.3 - 3.5 g/dL    Albumin/Globulin Ratio 1.2 1.2 - 3.5         ASSESSMENT and PLAN    Visit Diagnoses and Associated Orders       Primary hypertension    -  Primary    losartan-hydroCHLOROthiazide (HYZAAR) 100-25 MG per tablet [18095]           Hypothyroidism, unspecified type        levothyroxine (SYNTHROID) 112 MCG tablet [[id number]]           Vitamin D deficiency        ergocalciferol (ERGOCALCIFEROL) 1.25 MG (65103 UT) capsule [2863]           Hypercholesteremia        fenofibrate (TRIGLIDE) 160 MG tablet [[id number]]      atorvastatin (LIPITOR) 20 MG tablet [[id number]]      Lipid Panel [[id number] Custom]   - Future Order    Hepatic Function Panel [[id number] Custom]   - Future Order                     Diagnosis Orders   1. Primary hypertension  losartan-hydroCHLOROthiazide (HYZAAR) 100-25 MG per tablet      2. Hypothyroidism, unspecified type  levothyroxine (SYNTHROID) 112 MCG tablet      3. Vitamin D deficiency  ergocalciferol (ERGOCALCIFEROL) 1.25 MG (93775 UT) capsule      4. Hypercholesteremia  fenofibrate (TRIGLIDE) 160 MG tablet    atorvastatin (LIPITOR) 20 MG tablet    Lipid Panel    Hepatic Function Panel      , Diagnoses and all orders for this visit:    Primary hypertension  -     losartan-hydroCHLOROthiazide (HYZAAR) 100-25 MG per tablet; Take 1 tablet by mouth in the morning. Hypothyroidism, unspecified type  -     levothyroxine (SYNTHROID) 112 MCG tablet;  Take 1 tablet by mouth every morning (before breakfast)    Vitamin D deficiency  -     ergocalciferol (ERGOCALCIFEROL) 1.25 MG (84931 UT) capsule; Take 1 capsule by mouth every 7 days    Hypercholesteremia  -     fenofibrate (TRIGLIDE) 160 MG tablet; Take 1 tablet by mouth in the morning.  -     atorvastatin (LIPITOR) 20 MG tablet; Take 1 tablet by mouth in the morning.  -     Lipid Panel; Future  -     Hepatic Function Panel;  Future    , We will switch her over to Lipitor and Triglide because simvastatin is not working continue with diet and exercise refilled all her medications reviewed the rest of her lab work we will have her return back for her follow-up and recheck her labs in the near future otherwise we will see her back for some rash that she has that we can see over the phone

## 2022-08-10 ENCOUNTER — OFFICE VISIT (OUTPATIENT)
Dept: FAMILY MEDICINE CLINIC | Facility: CLINIC | Age: 57
End: 2022-08-10
Payer: COMMERCIAL

## 2022-08-10 VITALS
BODY MASS INDEX: 26.98 KG/M2 | WEIGHT: 158 LBS | HEIGHT: 64 IN | DIASTOLIC BLOOD PRESSURE: 84 MMHG | SYSTOLIC BLOOD PRESSURE: 120 MMHG

## 2022-08-10 DIAGNOSIS — H02.63 XANTHELASMA OF EYELID, BILATERAL: ICD-10-CM

## 2022-08-10 DIAGNOSIS — J30.89 ENVIRONMENTAL AND SEASONAL ALLERGIES: Primary | ICD-10-CM

## 2022-08-10 DIAGNOSIS — H02.66 XANTHELASMA OF EYELID, BILATERAL: ICD-10-CM

## 2022-08-10 PROCEDURE — 96372 THER/PROPH/DIAG INJ SC/IM: CPT | Performed by: FAMILY MEDICINE

## 2022-08-10 PROCEDURE — 99214 OFFICE O/P EST MOD 30 MIN: CPT | Performed by: FAMILY MEDICINE

## 2022-08-10 RX ORDER — FLUTICASONE PROPIONATE 50 MCG
2 SPRAY, SUSPENSION (ML) NASAL DAILY
Qty: 1 EACH | Refills: 11 | Status: SHIPPED | OUTPATIENT
Start: 2022-08-10

## 2022-08-10 RX ORDER — PSEUDOEPHEDRINE HYDROCHLORIDE 30 MG/1
30 TABLET ORAL EVERY 4 HOURS PRN
Qty: 30 TABLET | Refills: 0 | Status: SHIPPED | OUTPATIENT
Start: 2022-08-10 | End: 2022-09-09

## 2022-08-10 RX ORDER — FEXOFENADINE HCL 180 MG/1
180 TABLET ORAL DAILY
Qty: 30 TABLET | Refills: 11 | Status: SHIPPED | OUTPATIENT
Start: 2022-08-10 | End: 2022-09-09

## 2022-08-10 RX ORDER — LOSARTAN POTASSIUM AND HYDROCHLOROTHIAZIDE 25; 100 MG/1; MG/1
1 TABLET ORAL DAILY
Qty: 30 TABLET | Refills: 5 | Status: CANCELLED | OUTPATIENT
Start: 2022-08-10

## 2022-08-10 RX ORDER — GUAIFENESIN AND DEXTROMETHORPHAN HYDROBROMIDE 1200; 60 MG/1; MG/1
1 TABLET, EXTENDED RELEASE ORAL EVERY 12 HOURS
Qty: 60 TABLET | Refills: 0 | Status: SHIPPED | OUTPATIENT
Start: 2022-08-10

## 2022-08-10 RX ORDER — TRIAMCINOLONE ACETONIDE 40 MG/ML
40 INJECTION, SUSPENSION INTRA-ARTICULAR; INTRAMUSCULAR ONCE
Status: COMPLETED | OUTPATIENT
Start: 2022-08-10 | End: 2022-08-10

## 2022-08-10 RX ORDER — ERGOCALCIFEROL (VITAMIN D2) 1250 MCG
50000 CAPSULE ORAL
Qty: 4 CAPSULE | Refills: 5 | Status: CANCELLED | OUTPATIENT
Start: 2022-08-10

## 2022-08-10 RX ADMIN — TRIAMCINOLONE ACETONIDE 40 MG: 40 INJECTION, SUSPENSION INTRA-ARTICULAR; INTRAMUSCULAR at 08:45

## 2022-08-10 ASSESSMENT — ENCOUNTER SYMPTOMS
VOMITING: 0
VOICE CHANGE: 0
SHORTNESS OF BREATH: 0
COUGH: 0
SINUS PRESSURE: 1
NAUSEA: 0
SORE THROAT: 1
TROUBLE SWALLOWING: 0
WHEEZING: 0
RHINORRHEA: 0

## 2022-08-10 NOTE — PROGRESS NOTES
PROGRESS NOTE    SUBJECTIVE:   Christina Geller is a 65 y.o. female seen for a follow up visit regarding the following chief complaint:     Chief Complaint   Patient presents with    Other     Patient complains of throat discomfort and cough after taking her thyroid medication    Skin Lesion     Presents with itchy skin lesions below R  inferior eyelid very close to the internal corner           HPI patient presents office today complaining of a sore throat drainage and thinks it is from her thyroid medication clear watery rhinorrhea denies any fever chills not taking anything for it also complaining of xanthelasmas around her eyes and wants to know what  can be done about it      Past Medical History, Past Surgical History, Family history, Social History, and Medications were all reviewed with the patient today and updated as necessary. Current Outpatient Medications   Medication Sig Dispense Refill    levothyroxine (SYNTHROID) 112 MCG tablet Take 1 tablet by mouth every morning (before breakfast) 90 tablet 2    losartan-hydroCHLOROthiazide (HYZAAR) 100-25 MG per tablet Take 1 tablet by mouth in the morning. 90 tablet 2    ergocalciferol (ERGOCALCIFEROL) 1.25 MG (87480 UT) capsule Take 1 capsule by mouth every 7 days 12 capsule 2    atorvastatin (LIPITOR) 20 MG tablet Take 1 tablet by mouth in the morning. 30 tablet 3    cyclobenzaprine (FLEXERIL) 10 MG tablet Take 10 mg by mouth      conjugated estrogens (PREMARIN) 0.625 MG/GM vaginal cream Place 0.5 g vaginally daily      Lifitegrast (XIIDRA) 5 % SOLN 1 drop 2 times daily      naproxen (NAPROSYN) 500 MG tablet Take 500 mg by mouth 2 times daily (with meals)       No current facility-administered medications for this visit.      Allergies   Allergen Reactions    Lisinopril Other (See Comments)    Lisinopril-Hydrochlorothiazide Other (See Comments)     Persistent     Pravastatin Other (See Comments)    Rosuvastatin Other (See Comments)    Levothyroxine Other (See Comments) and Palpitations     Patient Active Problem List   Diagnosis    Gastroesophageal reflux disease with esophagitis    Spondylolisthesis of lumbar region    Diabetes mellitus, type II ([facility] [address]    Type 2 diabetes mellitus with diabetic neuropathy (HCC)    Diarrhea    Hypercholesteremia    Heart palpitations    Abdominal pain, generalized    Encounter for long-term (current) use of other medications    Chronic tension-type headache, intractable    Neuropathic pain, leg, bilateral    Environmental and seasonal allergies    B12 deficiency    GERD (gastroesophageal reflux disease)    Vitamin D deficiency    HTN (hypertension)    Non compliance w medication regimen    Hypothyroid     Past Medical History:   Diagnosis Date    B12 deficiency 6/10/2014    Diabetes mellitus, type II ([facility] [address]     \"pre-diabetic\" per patient. no meds. Avg. fbs 100-115. denies hypo.   Last HA1C was 6.5 on 11/2020    Encounter for long-term (current) use of other medications 10/4/2013    GERD (gastroesophageal reflux disease)     no meds    HTN (hypertension) 11/19/2012    Hypercholesteremia 11/19/2012    Hyperlipidemia     Hypothyroid 11/19/2012    Menopause     Neuropathic pain, leg, bilateral 4/16/2013    Non compliance w medication regimen 1/26/2015    Palpitations     evaluated by [facility] 2018  (\"related to dose of thyroid medication)    Psychiatric disorder     job related stress    Shoulder pain     chronic    Spondylolisthesis     Vitamin D deficiency 11/19/2012     Past Surgical History:   Procedure Laterality Date    BREAST REDUCTION SURGERY  1999    COLONOSCOPY N/A 2/12/2021    COLONOSCOPY/BMI 27 performed by Cordell Larsen MD at [facility] ENDOSCOPY    COLONOSCOPY FLX DX W/COLLJ SPEC WHEN PFRMD  2/12/2021          Family History   Problem Relation Age of Onset    Heart Disease Mother     Alcohol Abuse Father     Cancer Father         Liver    Elevated Lipids Father     High Cholesterol Mother     Breast Cancer Neg Hx Coronary Art Dis Neg Hx     Cancer Mother         Uterine    Stroke Mother     Hypertension Mother      Social History     Tobacco Use    Smoking status: Never    Smokeless tobacco: Never   Substance Use Topics    Alcohol use: Not Currently     Alcohol/week: 0.0 standard drinks         Review of Systems   Constitutional:  Negative for chills, fatigue and fever. HENT:  Positive for congestion, sinus pressure, sneezing and sore throat. Negative for rhinorrhea, trouble swallowing and voice change. Respiratory:  Negative for cough, shortness of breath and wheezing. Cardiovascular:  Negative for chest pain. Gastrointestinal:  Negative for nausea and vomiting. Musculoskeletal:  Negative for arthralgias. Skin:  Positive for rash. Neurological:  Negative for headaches. Hematological:  Negative for adenopathy. OBJECTIVE:  /84 (Site: Left Upper Arm, Position: Sitting, Cuff Size: Small Adult)   Ht 5' 4\" (1.626 m)   Wt 158 lb (71.7 kg)   BMI 27.12 kg/m²      Physical Exam     Medical problems and test results were reviewed with the patient today.      Recent Results (from the past 672 hour(s))   Lipid Panel    Collection Time: 08/01/22  8:11 AM   Result Value Ref Range    Cholesterol, Total 245 (H) <200 MG/DL    Triglycerides 391 (H) 35 - 150 MG/DL    HDL 40 40 - 60 MG/DL    LDL Calculated 126.8 (H) <100 MG/DL    VLDL Cholesterol Calculated 78.2 (H) 6.0 - 23.0 MG/DL    Chol/HDL Ratio 6.1     Hemoglobin A1C    Collection Time: 08/01/22  8:11 AM   Result Value Ref Range    Hemoglobin A1C 7.3 (H) 4.8 - 5.6 %    eAG 163 mg/dL   TSH    Collection Time: 08/01/22  8:11 AM   Result Value Ref Range    TSH, 3RD GENERATION 0.537 0.358 - 3.740 uIU/mL   Comprehensive Metabolic Panel    Collection Time: 08/01/22  8:11 AM   Result Value Ref Range    Sodium 138 136 - 145 mmol/L    Potassium 4.2 3.5 - 5.1 mmol/L    Chloride 104 98 - 107 mmol/L    CO2 30 21 - 32 mmol/L    Anion Gap 4 (L) 7 - 16 mmol/L    Glucose 148 (H) 65 - 100 mg/dL    BUN 16 6 - 23 MG/DL    Creatinine 0.70 0.6 - 1.0 MG/DL    GFR African American >60 >60 ml/min/1.73m2    GFR Non- >60 >60 ml/min/1.73m2    Calcium 9.2 8.3 - 10.4 MG/DL    Total Bilirubin 0.3 0.2 - 1.1 MG/DL    ALT 36 12 - 65 U/L    AST 17 15 - 37 U/L    Alk Phosphatase 113 50 - 136 U/L    Total Protein 7.3 6.3 - 8.2 g/dL    Albumin 4.0 3.5 - 5.0 g/dL    Globulin 3.3 2.3 - 3.5 g/dL    Albumin/Globulin Ratio 1.2 1.2 - 3.5         ASSESSMENT and PLAN    Visit Diagnoses and Associated Orders       Primary hypertension             Vitamin D deficiency                         Diagnosis Orders   1. Environmental and seasonal allergies  triamcinolone acetonide (KENALOG-40) injection 40 mg    fluticasone (FLONASE) 50 MCG/ACT nasal spray    fexofenadine (ALLEGRA) 180 MG tablet    pseudoephedrine (SUDAFED) 30 MG tablet    Dextromethorphan-guaiFENesin (MUCINEX DM MAXIMUM STRENGTH)  MG TB12      2. Xanthelasma of eyelid, bilateral        , Drew Taylor was seen today for other and skin lesion. Diagnoses and all orders for this visit:    Environmental and seasonal allergies  -     triamcinolone acetonide (KENALOG-40) injection 40 mg  -     fluticasone (FLONASE) 50 MCG/ACT nasal spray; 2 sprays by Each Nostril route in the morning.  -     fexofenadine (ALLEGRA) 180 MG tablet; Take 1 tablet by mouth in the morning.  -     pseudoephedrine (SUDAFED) 30 MG tablet;  Take 1 tablet by mouth every 4 hours as needed for Congestion  -     Dextromethorphan-guaiFENesin (MUCINEX DM MAXIMUM STRENGTH)  MG TB12; Take 1 tablet by mouth every 12 hours    Xanthelasma of eyelid, bilateral    , Reviewed her labs that we did yesterday I explained xanthelasmas can be treated via plastic surgery or dermatology but the bottom line is she needs to get her cholesterol under better control recommended allergy medicines for her runny nose and continue on her Synthroid

## 2022-09-20 ENCOUNTER — NURSE ONLY (OUTPATIENT)
Dept: FAMILY MEDICINE CLINIC | Facility: CLINIC | Age: 57
End: 2022-09-20

## 2022-09-20 DIAGNOSIS — E78.00 HYPERCHOLESTEREMIA: ICD-10-CM

## 2022-09-21 LAB
ALBUMIN SERPL-MCNC: 4.4 G/DL (ref 3.5–5)
ALBUMIN/GLOB SERPL: 1.5 {RATIO} (ref 1.2–3.5)
ALP SERPL-CCNC: 107 U/L (ref 50–136)
ALT SERPL-CCNC: 39 U/L (ref 12–65)
AST SERPL-CCNC: 16 U/L (ref 15–37)
BILIRUB DIRECT SERPL-MCNC: 0.1 MG/DL
BILIRUB SERPL-MCNC: 0.4 MG/DL (ref 0.2–1.1)
CHOLEST SERPL-MCNC: 190 MG/DL
GLOBULIN SER CALC-MCNC: 2.9 G/DL (ref 2.3–3.5)
HDLC SERPL-MCNC: 56 MG/DL (ref 40–60)
HDLC SERPL: 3.4 {RATIO}
LDLC SERPL CALC-MCNC: 103.4 MG/DL
PROT SERPL-MCNC: 7.3 G/DL (ref 6.3–8.2)
TRIGL SERPL-MCNC: 153 MG/DL (ref 35–150)
VLDLC SERPL CALC-MCNC: 30.6 MG/DL (ref 6–23)

## 2022-09-27 ENCOUNTER — TELEMEDICINE (OUTPATIENT)
Dept: FAMILY MEDICINE CLINIC | Facility: CLINIC | Age: 57
End: 2022-09-27
Payer: COMMERCIAL

## 2022-09-27 DIAGNOSIS — E55.9 VITAMIN D DEFICIENCY: ICD-10-CM

## 2022-09-27 DIAGNOSIS — Z00.00 ROUTINE GENERAL MEDICAL EXAMINATION AT A HEALTH CARE FACILITY: Primary | ICD-10-CM

## 2022-09-27 DIAGNOSIS — E78.00 HYPERCHOLESTEREMIA: ICD-10-CM

## 2022-09-27 PROCEDURE — 99442 PR PHYS/QHP TELEPHONE EVALUATION 11-20 MIN: CPT | Performed by: FAMILY MEDICINE

## 2022-09-27 RX ORDER — ERGOCALCIFEROL (VITAMIN D2) 1250 MCG
50000 CAPSULE ORAL
Qty: 12 CAPSULE | Refills: 2 | Status: SHIPPED | OUTPATIENT
Start: 2022-09-27

## 2022-09-27 RX ORDER — ATORVASTATIN CALCIUM 20 MG/1
20 TABLET, FILM COATED ORAL DAILY
Qty: 90 TABLET | Refills: 1 | Status: SHIPPED | OUTPATIENT
Start: 2022-09-27

## 2022-09-27 ASSESSMENT — ENCOUNTER SYMPTOMS
NAUSEA: 0
SHORTNESS OF BREATH: 0
VOMITING: 0

## 2022-09-27 NOTE — PROGRESS NOTES
PROGRESS NOTE  This visit was conducted via the phone with patient's consent to the visit and all associated charges to reduce the patient's risk of exposure to COVID-19 and continuity of care for an established patient. She and/or her healthcare decision maker is aware that this patient-initiated phone encounter is a billable service, with coverage as determined by her insurance carrier. She is aware that she may receive a bill and has provided verbal consent to proceed: Yes    Vitals and physical exam deferred due to telephone visit. Total Time: minutes: 11-20 minutes. SUBJECTIVE:   Brooke Cornelius is a 65 y.o. female seen for a follow up visit regarding the following chief complaint:         HPI patient is doing a phone call visit to go over her cholesterol      Past Medical History, Past Surgical History, Family history, Social History, and Medications were all reviewed with the patient today and updated as necessary. Current Outpatient Medications   Medication Sig Dispense Refill    atorvastatin (LIPITOR) 20 MG tablet Take 1 tablet by mouth daily 90 tablet 1    ergocalciferol (ERGOCALCIFEROL) 1.25 MG (26558 UT) capsule Take 1 capsule by mouth every 7 days 12 capsule 2    fluticasone (FLONASE) 50 MCG/ACT nasal spray 2 sprays by Each Nostril route in the morning. 1 each 11    Dextromethorphan-guaiFENesin (MUCINEX DM MAXIMUM STRENGTH)  MG TB12 Take 1 tablet by mouth every 12 hours 60 tablet 0    levothyroxine (SYNTHROID) 112 MCG tablet Take 1 tablet by mouth every morning (before breakfast) 90 tablet 2    losartan-hydroCHLOROthiazide (HYZAAR) 100-25 MG per tablet Take 1 tablet by mouth in the morning.  90 tablet 2    cyclobenzaprine (FLEXERIL) 10 MG tablet Take 10 mg by mouth      conjugated estrogens (PREMARIN) 0.625 MG/GM vaginal cream Place 0.5 g vaginally daily      Lifitegrast (XIIDRA) 5 % SOLN 1 drop 2 times daily      naproxen (NAPROSYN) 500 MG tablet Take 500 mg by mouth 2 times daily (with meals)       No current facility-administered medications for this visit. Allergies   Allergen Reactions    Lisinopril Other (See Comments)    Lisinopril-Hydrochlorothiazide Other (See Comments)     Persistent     Pravastatin Other (See Comments)    Rosuvastatin Other (See Comments)    Levothyroxine Other (See Comments) and Palpitations     Patient Active Problem List   Diagnosis    Gastroesophageal reflux disease with esophagitis    Spondylolisthesis of lumbar region    Diabetes mellitus, type II (HCC)    Type 2 diabetes mellitus with diabetic neuropathy (HCC)    Diarrhea    Hypercholesteremia    Heart palpitations    Abdominal pain, generalized    Encounter for long-term (current) use of other medications    Chronic tension-type headache, intractable    Neuropathic pain, leg, bilateral    Environmental and seasonal allergies    B12 deficiency    GERD (gastroesophageal reflux disease)    Vitamin D deficiency    HTN (hypertension)    Non compliance w medication regimen    Hypothyroid     Past Medical History:   Diagnosis Date    B12 deficiency 6/10/2014    Diabetes mellitus, type II ([facility] [address]     \"pre-diabetic\" per patient. no meds. Avg. fbs 100-115. denies hypo.   Last HA1C was 6.5 on 11/2020    Encounter for long-term (current) use of other medications 10/4/2013    GERD (gastroesophageal reflux disease)     no meds    HTN (hypertension) 11/19/2012    Hypercholesteremia 11/19/2012    Hyperlipidemia     Hypothyroid 11/19/2012    Menopause     Neuropathic pain, leg, bilateral 4/16/2013    Non compliance w medication regimen 1/26/2015    Palpitations     evaluated by [facility] 2018  (\"related to dose of thyroid medication)    Psychiatric disorder     job related stress    Shoulder pain     chronic    Spondylolisthesis     Vitamin D deficiency 11/19/2012     Past Surgical History:   Procedure Laterality Date    [address]    COLONOSCOPY N/A 2/12/2021    COLONOSCOPY/BMI 27 performed by Michaela Rodriguez MD at [facility] ENDOSCOPY    COLONOSCOPY FLX DX W/ANDREYJ [facility] WHEN PFRMD  2/12/2021          Family History   Problem Relation Age of Onset    Heart Disease Mother     Alcohol Abuse Father     Cancer Father         Liver    Elevated Lipids Father     High Cholesterol Mother     Breast Cancer Neg Hx     Coronary Art Dis Neg Hx     Cancer Mother         Uterine    Stroke Mother     Hypertension Mother      Social History     Tobacco Use    Smoking status: Never    Smokeless tobacco: Never   Substance Use Topics    Alcohol use: Not Currently     Alcohol/week: 0.0 standard drinks         Review of Systems   Constitutional:  Negative for fatigue and fever. Respiratory:  Negative for shortness of breath. Cardiovascular:  Negative for chest pain. Gastrointestinal:  Negative for nausea and vomiting. OBJECTIVE:  There were no vitals taken for this visit. Physical Exam     Medical problems and test results were reviewed with the patient today.      Recent Results (from the past 672 hour(s))   Hepatic Function Panel    Collection Time: 09/20/22  8:22 AM   Result Value Ref Range    Total Protein 7.3 6.3 - 8.2 g/dL    Albumin 4.4 3.5 - 5.0 g/dL    Globulin 2.9 2.3 - 3.5 g/dL    Albumin/Globulin Ratio 1.5 1.2 - 3.5      Total Bilirubin 0.4 0.2 - 1.1 MG/DL    Bilirubin, Direct 0.1 <0.4 MG/DL    Alk Phosphatase 107 50 - 136 U/L    AST 16 15 - 37 U/L    ALT 39 12 - 65 U/L   Lipid Panel    Collection Time: 09/20/22  8:22 AM   Result Value Ref Range    Cholesterol, Total 190 <200 MG/DL    Triglycerides 153 (H) 35 - 150 MG/DL    HDL 56 40 - 60 MG/DL    LDL Calculated 103.4 (H) <100 MG/DL    VLDL Cholesterol Calculated 30.6 (H) 6.0 - 23.0 MG/DL    Chol/HDL Ratio 3.4         ASSESSMENT and PLAN    Visit Diagnoses and Associated Orders       Hypercholesteremia        atorvastatin (LIPITOR) 20 MG tablet [[id number]]           Vitamin D deficiency        ergocalciferol (ERGOCALCIFEROL) 1.25 MG (33420 UT) capsule [[id number]]                       Diagnosis Orders   1. Hypercholesteremia  atorvastatin (LIPITOR) 20 MG tablet      2. Vitamin D deficiency  ergocalciferol (ERGOCALCIFEROL) 1.25 MG (56713 UT) capsule      , Diagnoses and all orders for this visit:    Hypercholesteremia  -     atorvastatin (LIPITOR) 20 MG tablet; Take 1 tablet by mouth daily    Vitamin D deficiency  -     ergocalciferol (ERGOCALCIFEROL) 1.25 MG (08505 UT) capsule;  Take 1 capsule by mouth every 7 days    , Patient's lab results are improved continue with her present medication diet and exercise and we will follow her back up in January when she is due for physical

## 2022-10-14 ENCOUNTER — TRANSCRIBE ORDERS (OUTPATIENT)
Dept: SCHEDULING | Age: 57
End: 2022-10-14

## 2022-10-14 DIAGNOSIS — Z12.31 VISIT FOR SCREENING MAMMOGRAM: Primary | ICD-10-CM

## 2022-11-07 ENCOUNTER — HOSPITAL ENCOUNTER (OUTPATIENT)
Dept: MAMMOGRAPHY | Age: 57
Discharge: HOME | End: 2022-11-10
Payer: COMMERCIAL

## 2022-11-07 DIAGNOSIS — Z12.31 VISIT FOR SCREENING MAMMOGRAM: ICD-10-CM

## 2022-11-07 PROCEDURE — 77067 SCR MAMMO BI INCL CAD: CPT

## 2022-12-08 ENCOUNTER — HOSPITAL ENCOUNTER (OUTPATIENT)
Dept: GENERAL RADIOLOGY | Age: 57
Discharge: HOME | End: 2022-12-11

## 2022-12-08 ENCOUNTER — OFFICE VISIT (OUTPATIENT)
Dept: FAMILY MEDICINE CLINIC | Facility: CLINIC | Age: 57
End: 2022-12-08
Payer: COMMERCIAL

## 2022-12-08 VITALS
DIASTOLIC BLOOD PRESSURE: 80 MMHG | SYSTOLIC BLOOD PRESSURE: 130 MMHG | HEIGHT: 64 IN | WEIGHT: 154 LBS | BODY MASS INDEX: 26.29 KG/M2

## 2022-12-08 DIAGNOSIS — E78.00 HYPERCHOLESTEREMIA: ICD-10-CM

## 2022-12-08 DIAGNOSIS — J30.89 ENVIRONMENTAL AND SEASONAL ALLERGIES: ICD-10-CM

## 2022-12-08 DIAGNOSIS — M25.512 CHRONIC LEFT SHOULDER PAIN: ICD-10-CM

## 2022-12-08 DIAGNOSIS — G89.29 CHRONIC RIGHT SHOULDER PAIN: Primary | ICD-10-CM

## 2022-12-08 DIAGNOSIS — M25.511 CHRONIC RIGHT SHOULDER PAIN: Primary | ICD-10-CM

## 2022-12-08 DIAGNOSIS — G89.29 CHRONIC LEFT SHOULDER PAIN: ICD-10-CM

## 2022-12-08 PROCEDURE — 3074F SYST BP LT 130 MM HG: CPT | Performed by: FAMILY MEDICINE

## 2022-12-08 PROCEDURE — 99214 OFFICE O/P EST MOD 30 MIN: CPT | Performed by: FAMILY MEDICINE

## 2022-12-08 PROCEDURE — 3079F DIAST BP 80-89 MM HG: CPT | Performed by: FAMILY MEDICINE

## 2022-12-08 RX ORDER — CYCLOBENZAPRINE HCL 10 MG
TABLET ORAL
Qty: 20 TABLET | Refills: 0 | Status: SHIPPED | OUTPATIENT
Start: 2022-12-08

## 2022-12-08 RX ORDER — ATORVASTATIN CALCIUM 20 MG/1
20 TABLET, FILM COATED ORAL DAILY
Qty: 90 TABLET | Refills: 1 | Status: SHIPPED | OUTPATIENT
Start: 2022-12-08

## 2022-12-08 RX ORDER — NAPROXEN 500 MG/1
500 TABLET ORAL 2 TIMES DAILY WITH MEALS
Qty: 60 TABLET | Refills: 0 | Status: SHIPPED | OUTPATIENT
Start: 2022-12-08

## 2022-12-08 SDOH — ECONOMIC STABILITY: FOOD INSECURITY: WITHIN THE PAST 12 MONTHS, YOU WORRIED THAT YOUR FOOD WOULD RUN OUT BEFORE YOU GOT MONEY TO BUY MORE.: NEVER TRUE

## 2022-12-08 SDOH — ECONOMIC STABILITY: FOOD INSECURITY: WITHIN THE PAST 12 MONTHS, THE FOOD YOU BOUGHT JUST DIDN'T LAST AND YOU DIDN'T HAVE MONEY TO GET MORE.: NEVER TRUE

## 2022-12-08 ASSESSMENT — PATIENT HEALTH QUESTIONNAIRE - PHQ9
SUM OF ALL RESPONSES TO PHQ9 QUESTIONS 1 & 2: 0
SUM OF ALL RESPONSES TO PHQ QUESTIONS 1-9: 0
SUM OF ALL RESPONSES TO PHQ QUESTIONS 1-9: 0
2. FEELING DOWN, DEPRESSED OR HOPELESS: 0
SUM OF ALL RESPONSES TO PHQ QUESTIONS 1-9: 0
1. LITTLE INTEREST OR PLEASURE IN DOING THINGS: 0
SUM OF ALL RESPONSES TO PHQ QUESTIONS 1-9: 0

## 2022-12-08 ASSESSMENT — ENCOUNTER SYMPTOMS
SHORTNESS OF BREATH: 0
VOMITING: 0
NAUSEA: 0

## 2022-12-08 ASSESSMENT — SOCIAL DETERMINANTS OF HEALTH (SDOH): HOW HARD IS IT FOR YOU TO PAY FOR THE VERY BASICS LIKE FOOD, HOUSING, MEDICAL CARE, AND HEATING?: NOT HARD AT ALL

## 2022-12-08 NOTE — PROGRESS NOTES
PROGRESS NOTE    SUBJECTIVE:   Nancy Garg is a 65 y.o. female seen for a follow up visit regarding the following chief complaint:     Chief Complaint   Patient presents with    Shoulder Pain     After a minor trauma 2 months ago patient started to fill L shoulder pain radiates to neck and the whole arm, numbness present, limited range of movements,           HPI presents office with multiple complaints mostly of shoulder pain going up into her neck and down her back denies any recent trauma also states that she has a cough from her levothyroxine states that every time she takes her thyroid levothyroxine in the morning half an hour later she coughs and clears her throat also needs refills on her medication      Past Medical History, Past Surgical History, Family history, Social History, and Medications were all reviewed with the patient today and updated as necessary. Current Outpatient Medications   Medication Sig Dispense Refill    atorvastatin (LIPITOR) 20 MG tablet Take 1 tablet by mouth daily 90 tablet 1    ergocalciferol (ERGOCALCIFEROL) 1.25 MG (39774 UT) capsule Take 1 capsule by mouth every 7 days 12 capsule 2    levothyroxine (SYNTHROID) 112 MCG tablet Take 1 tablet by mouth every morning (before breakfast) 90 tablet 2    losartan-hydroCHLOROthiazide (HYZAAR) 100-25 MG per tablet Take 1 tablet by mouth in the morning. 90 tablet 2    Lifitegrast (XIIDRA) 5 % SOLN 1 drop 2 times daily      cyclobenzaprine (FLEXERIL) 10 MG tablet Take 10 mg by mouth (Patient not taking: Reported on 12/8/2022)       No current facility-administered medications for this visit.      Allergies   Allergen Reactions    Lisinopril Other (See Comments)    Lisinopril-Hydrochlorothiazide Other (See Comments)     Persistent     Pravastatin Other (See Comments)    Rosuvastatin Other (See Comments)    Levothyroxine Other (See Comments) and Palpitations     Patient Active Problem List   Diagnosis    Gastroesophageal reflux disease with esophagitis    Spondylolisthesis of lumbar region    Diabetes mellitus, type II ([facility] [address]    Type 2 diabetes mellitus with diabetic neuropathy (HCC)    Diarrhea    Hypercholesteremia    Heart palpitations    Abdominal pain, generalized    Encounter for long-term (current) use of other medications    Chronic tension-type headache, intractable    Neuropathic pain, leg, bilateral    Environmental and seasonal allergies    B12 deficiency    GERD (gastroesophageal reflux disease)    Vitamin D deficiency    HTN (hypertension)    Non compliance w medication regimen    Hypothyroid     Past Medical History:   Diagnosis Date    B12 deficiency 6/10/2014    Diabetes mellitus, type II ([facility] [address]     \"pre-diabetic\" per patient. no meds. Avg. fbs 100-115. denies hypo.   Last HA1C was 6.5 on 11/2020    Encounter for long-term (current) use of other medications 10/4/2013    GERD (gastroesophageal reflux disease)     no meds    HTN (hypertension) 11/19/2012    Hypercholesteremia 11/19/2012    Hyperlipidemia     Hypothyroid 11/19/2012    Menopause     Neuropathic pain, leg, bilateral 4/16/2013    Non compliance w medication regimen 1/26/2015    Palpitations     evaluated by [facility] 2018  (\"related to dose of thyroid medication)    Psychiatric disorder     job related stress    Shoulder pain     chronic    Spondylolisthesis     Vitamin D deficiency 11/19/2012     Past Surgical History:   Procedure Laterality Date    BREAST REDUCTION SURGERY  1999    COLONOSCOPY N/A 2/12/2021    COLONOSCOPY/BMI 27 performed by Lidia Butler MD at [facility] ENDOSCOPY    COLONOSCOPY FLX DX W/COLLJ SPEC WHEN PFRMD  2/12/2021          Family History   Problem Relation Age of Onset    Heart Disease Mother     Alcohol Abuse Father     Cancer Father         Liver    Elevated Lipids Father     High Cholesterol Mother     Breast Cancer Neg Hx     Coronary Art Dis Neg Hx     Cancer Mother         Uterine    Stroke Mother     Hypertension Mother Social History     Tobacco Use    Smoking status: Never     Passive exposure: Never    Smokeless tobacco: Never   Substance Use Topics    Alcohol use: Not Currently     Alcohol/week: 0.0 standard drinks         Review of Systems   Constitutional:  Negative for fever. Respiratory:  Negative for shortness of breath. Cardiovascular:  Negative for chest pain. Gastrointestinal:  Negative for nausea and vomiting. Musculoskeletal:         Left shoulder pain       OBJECTIVE:  /80 (Site: Left Upper Arm, Position: Sitting, Cuff Size: Small Adult)   Ht 5' 4\" (1.626 m)   Wt 154 lb (69.9 kg)   BMI 26.43 kg/m²      Physical Exam  Musculoskeletal:      Right shoulder: Normal.      Left shoulder: Tenderness present. Decreased range of motion. Medical problems and test results were reviewed with the patient today. No results found for this or any previous visit (from the past 672 hour(s)). ASSESSMENT and PLAN    Visit Diagnoses and Associated Orders       Chronic right shoulder pain    -  Primary         Hypercholesteremia        atorvastatin (LIPITOR) 20 MG tablet [[id number]]                       Diagnosis Orders   1. Chronic right shoulder pain  cyclobenzaprine (FLEXERIL) 10 MG tablet    naproxen (NAPROSYN) 500 MG tablet      2. Hypercholesteremia  atorvastatin (LIPITOR) 20 MG tablet      3. Environmental and seasonal allergies        , Dong Rodarte was seen today for shoulder pain. Diagnoses and all orders for this visit:    Chronic right shoulder pain  -     Cancel: XR SHOULDER RIGHT (MIN 2 VIEWS); Future  -     Cancel: Simple Arm Sling ([employer] Deluxe Arm Sling)RETAIL $25  -     cyclobenzaprine (FLEXERIL) 10 MG tablet; 1 p.o. at at bedtime  -     naproxen (NAPROSYN) 500 MG tablet; Take 1 tablet by mouth 2 times daily (with meals)    Hypercholesteremia  -     atorvastatin (LIPITOR) 20 MG tablet;  Take 1 tablet by mouth daily    Environmental and seasonal allergies    , Refilled her medications recommended taking allergy medicine that previously her levothyroxine and Synthroid questionably caused her to cough is not enough and will think she needs to stop her medication or change it just needs to take medicines for allergies and recommended concerns her shoulder x-ray came back negative we will start her on Flexeril Naprosyn and discussed possibly needing physical therapy we will see her back in a couple weeks to see how she is doing a note for work was provided

## 2023-07-01 DIAGNOSIS — I10 PRIMARY HYPERTENSION: ICD-10-CM

## 2023-07-05 DIAGNOSIS — I10 PRIMARY HYPERTENSION: ICD-10-CM

## 2023-07-05 RX ORDER — LOSARTAN POTASSIUM AND HYDROCHLOROTHIAZIDE 25; 100 MG/1; MG/1
1 TABLET ORAL DAILY
Qty: 90 TABLET | Refills: 1 | Status: SHIPPED | OUTPATIENT
Start: 2023-07-05

## 2023-07-06 RX ORDER — LOSARTAN POTASSIUM AND HYDROCHLOROTHIAZIDE 25; 100 MG/1; MG/1
TABLET ORAL
Qty: 90 TABLET | Refills: 0 | OUTPATIENT
Start: 2023-07-06

## 2023-08-30 DIAGNOSIS — E11.40 TYPE 2 DIABETES MELLITUS WITH DIABETIC NEUROPATHY, WITHOUT LONG-TERM CURRENT USE OF INSULIN (HCC): ICD-10-CM

## 2023-08-30 DIAGNOSIS — E03.9 HYPOTHYROIDISM, UNSPECIFIED TYPE: ICD-10-CM

## 2023-08-30 DIAGNOSIS — E55.9 VITAMIN D DEFICIENCY: ICD-10-CM

## 2023-08-30 DIAGNOSIS — E78.00 HYPERCHOLESTEREMIA: ICD-10-CM

## 2023-08-30 DIAGNOSIS — Z11.59 NEED FOR HEPATITIS C SCREENING TEST: ICD-10-CM

## 2023-08-30 DIAGNOSIS — I10 PRIMARY HYPERTENSION: ICD-10-CM

## 2023-08-30 DIAGNOSIS — Z00.00 LABORATORY EXAMINATION ORDERED AS PART OF A ROUTINE GENERAL MEDICAL EXAMINATION: Primary | ICD-10-CM

## 2023-10-06 ENCOUNTER — NURSE ONLY (OUTPATIENT)
Dept: FAMILY MEDICINE CLINIC | Facility: CLINIC | Age: 58
End: 2023-10-06
Payer: COMMERCIAL

## 2023-10-06 DIAGNOSIS — Z00.00 LABORATORY EXAMINATION ORDERED AS PART OF A ROUTINE GENERAL MEDICAL EXAMINATION: Primary | ICD-10-CM

## 2023-10-06 DIAGNOSIS — I10 PRIMARY HYPERTENSION: ICD-10-CM

## 2023-10-06 DIAGNOSIS — E55.9 VITAMIN D DEFICIENCY: ICD-10-CM

## 2023-10-06 DIAGNOSIS — E78.00 HYPERCHOLESTEREMIA: ICD-10-CM

## 2023-10-06 DIAGNOSIS — E11.40 TYPE 2 DIABETES MELLITUS WITH DIABETIC NEUROPATHY, WITHOUT LONG-TERM CURRENT USE OF INSULIN (HCC): ICD-10-CM

## 2023-10-06 DIAGNOSIS — E03.9 HYPOTHYROIDISM, UNSPECIFIED TYPE: ICD-10-CM

## 2023-10-06 DIAGNOSIS — Z11.59 NEED FOR HEPATITIS C SCREENING TEST: ICD-10-CM

## 2023-10-06 LAB
BASOPHILS # BLD: 0.1 K/UL (ref 0–0.2)
BASOPHILS NFR BLD: 1 % (ref 0–2)
BILIRUBIN, URINE, POC: NEGATIVE
BLOOD URINE, POC: NEGATIVE
CREAT UR-MCNC: 100 MG/DL
DIFFERENTIAL METHOD BLD: ABNORMAL
EOSINOPHIL # BLD: 0.1 K/UL (ref 0–0.8)
EOSINOPHIL NFR BLD: 2 % (ref 0.5–7.8)
ERYTHROCYTE [DISTWIDTH] IN BLOOD BY AUTOMATED COUNT: 12.8 % (ref 11.9–14.6)
GLUCOSE URINE, POC: ABNORMAL
HCT VFR BLD AUTO: 46.3 % (ref 35.8–46.3)
HGB BLD-MCNC: 14.7 G/DL (ref 11.7–15.4)
IMM GRANULOCYTES # BLD AUTO: 0 K/UL (ref 0–0.5)
IMM GRANULOCYTES NFR BLD AUTO: 0 % (ref 0–5)
KETONES, URINE, POC: NEGATIVE
LEUKOCYTE ESTERASE, URINE, POC: NEGATIVE
LYMPHOCYTES # BLD: 1.7 K/UL (ref 0.5–4.6)
LYMPHOCYTES NFR BLD: 24 % (ref 13–44)
MCH RBC QN AUTO: 27.4 PG (ref 26.1–32.9)
MCHC RBC AUTO-ENTMCNC: 31.7 G/DL (ref 31.4–35)
MCV RBC AUTO: 86.4 FL (ref 82–102)
MICROALBUMIN UR-MCNC: 0.58 MG/DL
MICROALBUMIN/CREAT UR-RTO: 6 MG/G (ref 0–30)
MONOCYTES # BLD: 0.6 K/UL (ref 0.1–1.3)
MONOCYTES NFR BLD: 9 % (ref 4–12)
NEUTS SEG # BLD: 4.4 K/UL (ref 1.7–8.2)
NEUTS SEG NFR BLD: 64 % (ref 43–78)
NITRITE, URINE, POC: NEGATIVE
NRBC # BLD: 0 K/UL (ref 0–0.2)
PH, URINE, POC: 5.5 (ref 4.6–8)
PLATELET # BLD AUTO: 395 K/UL (ref 150–450)
PMV BLD AUTO: 9.5 FL (ref 9.4–12.3)
PROTEIN,URINE, POC: NEGATIVE
RBC # BLD AUTO: 5.36 M/UL (ref 4.05–5.2)
SPECIFIC GRAVITY, URINE, POC: 1.01 (ref 1–1.03)
URINALYSIS CLARITY, POC: ABNORMAL
URINALYSIS COLOR, POC: YELLOW
UROBILINOGEN, POC: NORMAL
WBC # BLD AUTO: 6.9 K/UL (ref 4.3–11.1)

## 2023-10-06 PROCEDURE — 81003 URINALYSIS AUTO W/O SCOPE: CPT | Performed by: FAMILY MEDICINE

## 2023-10-07 LAB
25(OH)D3 SERPL-MCNC: 77.4 NG/ML (ref 30–100)
ALBUMIN SERPL-MCNC: 4.2 G/DL (ref 3.5–5)
ALBUMIN/GLOB SERPL: 1.3 (ref 0.4–1.6)
ALP SERPL-CCNC: 101 U/L (ref 50–136)
ALT SERPL-CCNC: 29 U/L (ref 12–65)
ANION GAP SERPL CALC-SCNC: 8 MMOL/L (ref 2–11)
AST SERPL-CCNC: 16 U/L (ref 15–37)
BILIRUB SERPL-MCNC: 0.3 MG/DL (ref 0.2–1.1)
BUN SERPL-MCNC: 22 MG/DL (ref 6–23)
CALCIUM SERPL-MCNC: 10 MG/DL (ref 8.3–10.4)
CHLORIDE SERPL-SCNC: 106 MMOL/L (ref 101–110)
CHOLEST SERPL-MCNC: 302 MG/DL
CO2 SERPL-SCNC: 29 MMOL/L (ref 21–32)
CREAT SERPL-MCNC: 0.7 MG/DL (ref 0.6–1)
EST. AVERAGE GLUCOSE BLD GHB EST-MCNC: 151 MG/DL
GLOBULIN SER CALC-MCNC: 3.3 G/DL (ref 2.8–4.5)
GLUCOSE SERPL-MCNC: 126 MG/DL (ref 65–100)
HBA1C MFR BLD: 6.9 % (ref 4.8–5.6)
HCV AB SER QL: NONREACTIVE
HDLC SERPL-MCNC: 51 MG/DL (ref 40–60)
HDLC SERPL: 5.9
HIV 1+2 AB+HIV1 P24 AG SERPL QL IA: NONREACTIVE
HIV 1/2 RESULT COMMENT: NORMAL
LDLC SERPL CALC-MCNC: 191.2 MG/DL
POTASSIUM SERPL-SCNC: 4.6 MMOL/L (ref 3.5–5.1)
PROT SERPL-MCNC: 7.5 G/DL (ref 6.3–8.2)
SODIUM SERPL-SCNC: 143 MMOL/L (ref 133–143)
TRIGL SERPL-MCNC: 299 MG/DL (ref 35–150)
TSH, 3RD GENERATION: 0.37 UIU/ML (ref 0.36–3.74)
VLDLC SERPL CALC-MCNC: 59.8 MG/DL (ref 6–23)

## 2023-10-17 ENCOUNTER — TRANSCRIBE ORDERS (OUTPATIENT)
Dept: SCHEDULING | Age: 58
End: 2023-10-17

## 2023-10-17 DIAGNOSIS — Z12.31 ENCOUNTER FOR SCREENING MAMMOGRAM FOR MALIGNANT NEOPLASM OF BREAST: Primary | ICD-10-CM

## 2023-10-23 ENCOUNTER — OFFICE VISIT (OUTPATIENT)
Dept: FAMILY MEDICINE CLINIC | Facility: CLINIC | Age: 58
End: 2023-10-23
Payer: COMMERCIAL

## 2023-10-23 VITALS — HEIGHT: 64 IN | BODY MASS INDEX: 25.44 KG/M2 | WEIGHT: 149 LBS

## 2023-10-23 DIAGNOSIS — E78.00 HYPERCHOLESTEREMIA: ICD-10-CM

## 2023-10-23 DIAGNOSIS — Z12.4 PAP SMEAR FOR CERVICAL CANCER SCREENING: Primary | ICD-10-CM

## 2023-10-23 DIAGNOSIS — E11.40 TYPE 2 DIABETES MELLITUS WITH DIABETIC NEUROPATHY, WITHOUT LONG-TERM CURRENT USE OF INSULIN (HCC): ICD-10-CM

## 2023-10-23 DIAGNOSIS — Z78.9 STATIN INTOLERANCE: ICD-10-CM

## 2023-10-23 DIAGNOSIS — I10 PRIMARY HYPERTENSION: ICD-10-CM

## 2023-10-23 DIAGNOSIS — Z13.31 SCREENING FOR DEPRESSION: ICD-10-CM

## 2023-10-23 DIAGNOSIS — Z23 NEED FOR IMMUNIZATION AGAINST INFLUENZA: ICD-10-CM

## 2023-10-23 DIAGNOSIS — E03.9 HYPOTHYROIDISM, UNSPECIFIED TYPE: ICD-10-CM

## 2023-10-23 PROCEDURE — 90674 CCIIV4 VAC NO PRSV 0.5 ML IM: CPT | Performed by: FAMILY MEDICINE

## 2023-10-23 PROCEDURE — 90471 IMMUNIZATION ADMIN: CPT | Performed by: FAMILY MEDICINE

## 2023-10-23 PROCEDURE — 99396 PREV VISIT EST AGE 40-64: CPT | Performed by: FAMILY MEDICINE

## 2023-10-23 RX ORDER — LEVOTHYROXINE SODIUM 112 UG/1
112 TABLET ORAL
Qty: 90 TABLET | Refills: 3 | Status: SHIPPED | OUTPATIENT
Start: 2023-10-23

## 2023-10-23 RX ORDER — LOSARTAN POTASSIUM AND HYDROCHLOROTHIAZIDE 25; 100 MG/1; MG/1
1 TABLET ORAL DAILY
Qty: 90 TABLET | Refills: 3 | Status: SHIPPED | OUTPATIENT
Start: 2023-10-23

## 2023-10-23 ASSESSMENT — ENCOUNTER SYMPTOMS
SHORTNESS OF BREATH: 0
NAUSEA: 0
VOMITING: 0

## 2023-10-23 NOTE — PROGRESS NOTES
losartan-hydroCHLOROthiazide (HYZAAR) 100-25 MG per tablet; Take 1 tablet by mouth daily    Hypothyroidism, unspecified type  -     levothyroxine (SYNTHROID) 112 MCG tablet; Take 1 tablet by mouth every morning (before breakfast)    Need for immunization against influenza  -     Influenza, FLUCELVAX, (age 10 mo+), IM, Preservative Free, 0.5 mL    Statin intolerance  -     Evolocumab (REPATHA) SOSY syringe; Inject 1 mL into the skin every 14 days  -     Lipid Panel; Future    Type 2 diabetes mellitus with diabetic neuropathy, without long-term current use of insulin (HCC)  -     Evolocumab (REPATHA) SOSY syringe; Inject 1 mL into the skin every 14 days  -     External Referral to Ophthalmology    Hypercholesteremia  -     Evolocumab (REPATHA) SOSY syringe; Inject 1 mL into the skin every 14 days  -     Lipid Panel; Future    , Reviewed all her labs answered all her questions she has lost some weight and her A1c is in great control I can argue about her having statin intolerant and feeling bad we will see if she qualifies for Repatha if not we might need to get her previous cardiologist involved to see if they can help get Repatha for her after she starts it we will check her lipid panel for triglycerides elevated will discuss starting her on a fenofibrate should not be causing her myalgias is much as her statins were supportive care given precautions given we will recheck her A1c in 6 months call back with the results of her Pap smear if abnormal.I have spent a total of 8-15 minutes assessing, reviewing, and discussing the depression screening with patient in office today.

## 2023-11-10 ENCOUNTER — OFFICE VISIT (OUTPATIENT)
Dept: FAMILY MEDICINE CLINIC | Facility: CLINIC | Age: 58
End: 2023-11-10

## 2023-11-10 VITALS
HEART RATE: 95 BPM | BODY MASS INDEX: 25.61 KG/M2 | DIASTOLIC BLOOD PRESSURE: 78 MMHG | WEIGHT: 150 LBS | SYSTOLIC BLOOD PRESSURE: 124 MMHG | HEIGHT: 64 IN

## 2023-11-10 DIAGNOSIS — Q82.8 ACCESSORY SKIN TAGS: Primary | ICD-10-CM

## 2023-11-10 DIAGNOSIS — E55.9 VITAMIN D DEFICIENCY: ICD-10-CM

## 2023-11-10 DIAGNOSIS — D22.5 MELANOCYTIC NEVUS OF TRUNK: ICD-10-CM

## 2023-11-10 RX ORDER — ERGOCALCIFEROL 1.25 MG/1
50000 CAPSULE ORAL
Qty: 12 CAPSULE | Refills: 3 | Status: SHIPPED | OUTPATIENT
Start: 2023-11-10

## 2023-11-10 RX ORDER — ERGOCALCIFEROL 1.25 MG/1
50000 CAPSULE ORAL
Qty: 12 CAPSULE | Refills: 2 | Status: SHIPPED | OUTPATIENT
Start: 2023-11-10 | End: 2023-11-10 | Stop reason: SINTOL

## 2023-11-10 ASSESSMENT — ENCOUNTER SYMPTOMS: SHORTNESS OF BREATH: 0

## 2023-11-18 ENCOUNTER — HOSPITAL ENCOUNTER (OUTPATIENT)
Dept: MAMMOGRAPHY | Age: 58
End: 2023-11-18
Attending: FAMILY MEDICINE
Payer: COMMERCIAL

## 2023-11-18 DIAGNOSIS — Z12.31 ENCOUNTER FOR SCREENING MAMMOGRAM FOR MALIGNANT NEOPLASM OF BREAST: ICD-10-CM

## 2023-11-18 PROCEDURE — 77063 BREAST TOMOSYNTHESIS BI: CPT

## 2024-03-29 ENCOUNTER — TELEPHONE (OUTPATIENT)
Dept: FAMILY MEDICINE CLINIC | Facility: CLINIC | Age: 59
End: 2024-03-29

## 2024-04-02 ENCOUNTER — NURSE ONLY (OUTPATIENT)
Dept: FAMILY MEDICINE CLINIC | Facility: CLINIC | Age: 59
End: 2024-04-02
Payer: COMMERCIAL

## 2024-04-02 DIAGNOSIS — Z78.9 STATIN INTOLERANCE: ICD-10-CM

## 2024-04-02 DIAGNOSIS — E78.00 HYPERCHOLESTEREMIA: ICD-10-CM

## 2024-04-02 LAB
CHOLEST SERPL-MCNC: 220 MG/DL
HDLC SERPL-MCNC: 48 MG/DL (ref 40–60)
HDLC SERPL: 4.6
LDLC SERPL CALC-MCNC: 123.4 MG/DL
TRIGL SERPL-MCNC: 243 MG/DL (ref 35–150)
VLDLC SERPL CALC-MCNC: 48.6 MG/DL (ref 6–23)

## 2024-04-02 PROCEDURE — 36415 COLL VENOUS BLD VENIPUNCTURE: CPT | Performed by: FAMILY MEDICINE

## 2024-04-09 ENCOUNTER — OFFICE VISIT (OUTPATIENT)
Dept: FAMILY MEDICINE CLINIC | Facility: CLINIC | Age: 59
End: 2024-04-09
Payer: COMMERCIAL

## 2024-04-09 VITALS
HEIGHT: 64 IN | SYSTOLIC BLOOD PRESSURE: 130 MMHG | BODY MASS INDEX: 25.95 KG/M2 | HEART RATE: 68 BPM | WEIGHT: 152 LBS | DIASTOLIC BLOOD PRESSURE: 86 MMHG

## 2024-04-09 DIAGNOSIS — Z12.4 PAP SMEAR FOR CERVICAL CANCER SCREENING: Primary | ICD-10-CM

## 2024-04-09 DIAGNOSIS — E03.9 HYPOTHYROIDISM, UNSPECIFIED TYPE: ICD-10-CM

## 2024-04-09 DIAGNOSIS — E78.00 HYPERCHOLESTEREMIA: ICD-10-CM

## 2024-04-09 DIAGNOSIS — J30.89 ENVIRONMENTAL AND SEASONAL ALLERGIES: ICD-10-CM

## 2024-04-09 DIAGNOSIS — E11.40 TYPE 2 DIABETES MELLITUS WITH DIABETIC NEUROPATHY, WITHOUT LONG-TERM CURRENT USE OF INSULIN (HCC): ICD-10-CM

## 2024-04-09 DIAGNOSIS — K21.9 GERD WITHOUT ESOPHAGITIS: ICD-10-CM

## 2024-04-09 DIAGNOSIS — I10 PRIMARY HYPERTENSION: ICD-10-CM

## 2024-04-09 LAB
ALBUMIN SERPL-MCNC: 4.1 G/DL (ref 3.5–5)
ALBUMIN/GLOB SERPL: 1.4 (ref 0.4–1.6)
ALP SERPL-CCNC: 109 U/L (ref 50–136)
ALT SERPL-CCNC: 36 U/L (ref 12–65)
AST SERPL-CCNC: 17 U/L (ref 15–37)
BILIRUB DIRECT SERPL-MCNC: 0.1 MG/DL
BILIRUB SERPL-MCNC: 0.3 MG/DL (ref 0.2–1.1)
CHOLEST SERPL-MCNC: 246 MG/DL
GLOBULIN SER CALC-MCNC: 3 G/DL (ref 2.8–4.5)
HDLC SERPL-MCNC: 46 MG/DL (ref 40–60)
HDLC SERPL: 5.3
LDLC SERPL CALC-MCNC: 129.6 MG/DL
PROT SERPL-MCNC: 7.1 G/DL (ref 6.3–8.2)
TRIGL SERPL-MCNC: 352 MG/DL (ref 35–150)
VLDLC SERPL CALC-MCNC: 70.4 MG/DL (ref 6–23)

## 2024-04-09 PROCEDURE — 3075F SYST BP GE 130 - 139MM HG: CPT | Performed by: FAMILY MEDICINE

## 2024-04-09 PROCEDURE — 99215 OFFICE O/P EST HI 40 MIN: CPT | Performed by: FAMILY MEDICINE

## 2024-04-09 PROCEDURE — 3079F DIAST BP 80-89 MM HG: CPT | Performed by: FAMILY MEDICINE

## 2024-04-09 RX ORDER — OMEPRAZOLE 40 MG/1
40 CAPSULE, DELAYED RELEASE ORAL
Qty: 30 CAPSULE | Refills: 1 | Status: SHIPPED | OUTPATIENT
Start: 2024-04-09

## 2024-04-09 RX ORDER — GUAIFENESIN AND DEXTROMETHORPHAN HYDROBROMIDE 1200; 60 MG/1; MG/1
1 TABLET, EXTENDED RELEASE ORAL EVERY 12 HOURS
Qty: 20 TABLET | Refills: 0 | Status: SHIPPED | OUTPATIENT
Start: 2024-04-09 | End: 2024-04-19

## 2024-04-09 RX ORDER — ATORVASTATIN CALCIUM 10 MG/1
10 TABLET, FILM COATED ORAL DAILY
Qty: 90 TABLET | Refills: 3 | Status: SHIPPED | OUTPATIENT
Start: 2024-04-09

## 2024-04-09 RX ORDER — EZETIMIBE 10 MG/1
10 TABLET ORAL DAILY
Qty: 90 TABLET | Refills: 3 | Status: SHIPPED | OUTPATIENT
Start: 2024-04-09

## 2024-04-09 RX ORDER — FEXOFENADINE HCL 180 MG/1
180 TABLET ORAL DAILY
Qty: 30 TABLET | Refills: 11 | Status: SHIPPED | OUTPATIENT
Start: 2024-04-09 | End: 2024-05-09

## 2024-04-09 RX ORDER — FLUTICASONE PROPIONATE 50 MCG
2 SPRAY, SUSPENSION (ML) NASAL DAILY
Qty: 16 G | Refills: 11 | Status: SHIPPED | OUTPATIENT
Start: 2024-04-09

## 2024-04-09 RX ORDER — LOSARTAN POTASSIUM AND HYDROCHLOROTHIAZIDE 25; 100 MG/1; MG/1
1 TABLET ORAL DAILY
Qty: 90 TABLET | Refills: 3 | Status: SHIPPED | OUTPATIENT
Start: 2024-04-09

## 2024-04-09 ASSESSMENT — ENCOUNTER SYMPTOMS
VOMITING: 0
SINUS PRESSURE: 1
SINUS PAIN: 1
NAUSEA: 0
SHORTNESS OF BREATH: 0
RHINORRHEA: 1

## 2024-04-09 NOTE — PROGRESS NOTES
PROGRESS NOTE    SUBJECTIVE:   Tati Robertson is a 59 y.o. female seen for a follow up visit regarding the following chief complaint:     Chief Complaint   Patient presents with    Other     pap           HPI  Patient presents to the office today for Pap smear and has a long list of multiple complaints 1 of is that Omar gave her COVID like symptoms and she never took it presents to the office today for follow-up of her cholesterol which is obviously elevated because she never took the medicine and I questioned her head and she knows if she had COVID-like symptoms without taking a COVID test calling the office or following up after as instructed she said because she read the package insert.  Also complaining of phlegm coming up or down but does not want to take allergy medicines for that problem and admits to reflux so it could be a combination of both but after long lengthy conversation about taking medicines for multiple problems she states she wants to know if there is anything natural she can take talk to her about avoidance and about seeing an allergist for immunotherapy    Past Medical History, Past Surgical History, Family history, Social History, and Medications were all reviewed with the patient today and updated as necessary.       Current Outpatient Medications   Medication Sig Dispense Refill    losartan-hydroCHLOROthiazide (HYZAAR) 100-25 MG per tablet Take 1 tablet by mouth daily 90 tablet 3    atorvastatin (LIPITOR) 10 MG tablet Take 1 tablet by mouth daily 90 tablet 3    ezetimibe (ZETIA) 10 MG tablet Take 1 tablet by mouth daily 90 tablet 3    fexofenadine (ALLEGRA) 180 MG tablet Take 1 tablet by mouth daily 30 tablet 11    fluticasone (FLONASE) 50 MCG/ACT nasal spray 2 sprays by Each Nostril route daily 16 g 11    Dextromethorphan-guaiFENesin (MUCINEX DM MAXIMUM STRENGTH)  MG TB12 Take 1 tablet by mouth in the morning and 1 tablet in the evening. Do all this for 10 days. 20 tablet 0

## 2024-04-10 LAB
EST. AVERAGE GLUCOSE BLD GHB EST-MCNC: 157 MG/DL
HBA1C MFR BLD: 7.1 % (ref 4.8–5.6)

## 2024-04-16 ENCOUNTER — TELEMEDICINE (OUTPATIENT)
Dept: FAMILY MEDICINE CLINIC | Facility: CLINIC | Age: 59
End: 2024-04-16
Payer: COMMERCIAL

## 2024-04-16 DIAGNOSIS — E11.40 TYPE 2 DIABETES MELLITUS WITH DIABETIC NEUROPATHY, WITHOUT LONG-TERM CURRENT USE OF INSULIN (HCC): ICD-10-CM

## 2024-04-16 DIAGNOSIS — E78.00 HYPERCHOLESTEREMIA: ICD-10-CM

## 2024-04-16 DIAGNOSIS — I10 PRIMARY HYPERTENSION: Primary | ICD-10-CM

## 2024-04-16 LAB
COLLECTION METHOD: NORMAL
CYTOLOGIST CVX/VAG CYTO: NORMAL
CYTOLOGY CVX/VAG DOC THIN PREP: NORMAL
Lab: NORMAL
OTHER PT INFO: NORMAL
PAP SOURCE: NORMAL
PATH REPORT.FINAL DX SPEC: NORMAL
PREV TREATMENT RESULTS: NORMAL
PREV TREATMENT: NORMAL
STAT OF ADQ CVX/VAG CYTO-IMP: NORMAL

## 2024-04-16 PROCEDURE — 99442 PR PHYS/QHP TELEPHONE EVALUATION 11-20 MIN: CPT | Performed by: FAMILY MEDICINE

## 2024-04-16 SDOH — ECONOMIC STABILITY: HOUSING INSECURITY
IN THE LAST 12 MONTHS, WAS THERE A TIME WHEN YOU DID NOT HAVE A STEADY PLACE TO SLEEP OR SLEPT IN A SHELTER (INCLUDING NOW)?: NO

## 2024-04-16 SDOH — ECONOMIC STABILITY: FOOD INSECURITY: WITHIN THE PAST 12 MONTHS, YOU WORRIED THAT YOUR FOOD WOULD RUN OUT BEFORE YOU GOT MONEY TO BUY MORE.: NEVER TRUE

## 2024-04-16 SDOH — ECONOMIC STABILITY: FOOD INSECURITY: WITHIN THE PAST 12 MONTHS, THE FOOD YOU BOUGHT JUST DIDN'T LAST AND YOU DIDN'T HAVE MONEY TO GET MORE.: NEVER TRUE

## 2024-04-16 SDOH — ECONOMIC STABILITY: INCOME INSECURITY: HOW HARD IS IT FOR YOU TO PAY FOR THE VERY BASICS LIKE FOOD, HOUSING, MEDICAL CARE, AND HEATING?: NOT HARD AT ALL

## 2024-04-16 ASSESSMENT — PATIENT HEALTH QUESTIONNAIRE - PHQ9
SUM OF ALL RESPONSES TO PHQ9 QUESTIONS 1 & 2: 0
SUM OF ALL RESPONSES TO PHQ QUESTIONS 1-9: 0
2. FEELING DOWN, DEPRESSED OR HOPELESS: NOT AT ALL
SUM OF ALL RESPONSES TO PHQ QUESTIONS 1-9: 0
SUM OF ALL RESPONSES TO PHQ QUESTIONS 1-9: 0
1. LITTLE INTEREST OR PLEASURE IN DOING THINGS: NOT AT ALL
SUM OF ALL RESPONSES TO PHQ QUESTIONS 1-9: 0

## 2024-04-16 ASSESSMENT — ENCOUNTER SYMPTOMS
SHORTNESS OF BREATH: 0
VOMITING: 0
NAUSEA: 0

## 2024-04-16 NOTE — PROGRESS NOTES
PROGRESS NOTE    SUBJECTIVE:   Tati Robertson is a 59 y.o. female seen for a follow up visit regarding the following chief complaint:     No chief complaint on file.          HPI patient is doing a phone call visit to go over her lab results without any new complaintsTati Robertson is a 59 y.o. female evaluated via telephone on 4/16/2024 for No chief complaint on file.  .        Total Time: minutes: 11-20 minutes    Tati Robertson was evaluated through a synchronous (real-time) audio encounter. Patient identification was verified at the start of the visit. She (or guardian if applicable) is aware that this is a billable service, which includes applicable co-pays. This visit was conducted with the patient's (and/or legal guardian's) verbal consent. She has not had a related appointment within my department in the past 7 days or scheduled within the next 24 hours.   The patient was located at Home: [address] [place] [id number].  The provider was located at Facility (Appt Dept): [address] [place] [address] Dr Brown [address] [place] [id number].    Note: not billable if this call serves to triage the patient into an appointment for the relevant concern         Past Medical History, Past Surgical History, Family history, Social History, and Medications were all reviewed with the patient today and updated as necessary.       Current Outpatient Medications   Medication Sig Dispense Refill    losartan-hydroCHLOROthiazide (HYZAAR) 100-25 MG per tablet Take 1 tablet by mouth daily 90 tablet 3    atorvastatin (LIPITOR) 10 MG tablet Take 1 tablet by mouth daily 90 tablet 3    ezetimibe (ZETIA) 10 MG tablet Take 1 tablet by mouth daily 90 tablet 3    fexofenadine (ALLEGRA) 180 MG tablet Take 1 tablet by mouth daily 30 tablet 11    fluticasone (FLONASE) 50 MCG/ACT nasal spray 2 sprays by Each Nostril route daily 16 g 11    Dextromethorphan-guaiFENesin (MUCINEX DM MAXIMUM STRENGTH)  MG TB12 Take 1 tablet by

## 2024-08-22 ENCOUNTER — LAB (OUTPATIENT)
Dept: FAMILY MEDICINE CLINIC | Facility: CLINIC | Age: 59
End: 2024-08-22

## 2024-08-22 DIAGNOSIS — I10 PRIMARY HYPERTENSION: ICD-10-CM

## 2024-08-22 DIAGNOSIS — E78.00 HYPERCHOLESTEREMIA: ICD-10-CM

## 2024-08-22 DIAGNOSIS — E11.40 TYPE 2 DIABETES MELLITUS WITH DIABETIC NEUROPATHY, WITHOUT LONG-TERM CURRENT USE OF INSULIN (HCC): Primary | ICD-10-CM

## 2024-08-22 LAB
ALBUMIN SERPL-MCNC: 4 G/DL (ref 3.5–5)
ALBUMIN/GLOB SERPL: 1.4 (ref 1–1.9)
ALP SERPL-CCNC: 120 U/L (ref 35–104)
ALT SERPL-CCNC: 19 U/L (ref 12–65)
ANION GAP SERPL CALC-SCNC: 10 MMOL/L (ref 9–18)
AST SERPL-CCNC: 19 U/L (ref 15–37)
BILIRUB SERPL-MCNC: <0.2 MG/DL (ref 0–1.2)
BUN SERPL-MCNC: 16 MG/DL (ref 6–23)
CALCIUM SERPL-MCNC: 9.6 MG/DL (ref 8.8–10.2)
CHLORIDE SERPL-SCNC: 101 MMOL/L (ref 98–107)
CHOLEST SERPL-MCNC: 261 MG/DL (ref 0–200)
CO2 SERPL-SCNC: 28 MMOL/L (ref 20–28)
CREAT SERPL-MCNC: 0.6 MG/DL (ref 0.6–1.1)
EST. AVERAGE GLUCOSE BLD GHB EST-MCNC: 175 MG/DL
GLOBULIN SER CALC-MCNC: 2.9 G/DL (ref 2.3–3.5)
GLUCOSE SERPL-MCNC: 136 MG/DL (ref 70–99)
HBA1C MFR BLD: 7.7 % (ref 0–5.6)
HDLC SERPL-MCNC: 33 MG/DL (ref 40–60)
HDLC SERPL: 8 (ref 0–5)
LDLC SERPL CALC-MCNC: ABNORMAL MG/DL (ref 0–100)
LDLC SERPL DIRECT ASSAY-MCNC: 89 MG/DL (ref 0–100)
POTASSIUM SERPL-SCNC: 4.7 MMOL/L (ref 3.5–5.1)
PROT SERPL-MCNC: 7 G/DL (ref 6.3–8.2)
SODIUM SERPL-SCNC: 139 MMOL/L (ref 136–145)
TRIGL SERPL-MCNC: 738 MG/DL (ref 0–150)
VLDLC SERPL CALC-MCNC: 148 MG/DL (ref 6–23)

## 2024-08-29 ENCOUNTER — TELEMEDICINE (OUTPATIENT)
Dept: FAMILY MEDICINE CLINIC | Facility: CLINIC | Age: 59
End: 2024-08-29
Payer: COMMERCIAL

## 2024-08-29 DIAGNOSIS — E78.49 FAMILIAL HYPERLIPIDEMIA: Primary | ICD-10-CM

## 2024-08-29 DIAGNOSIS — Z78.9 STATIN INTOLERANCE: ICD-10-CM

## 2024-08-29 PROCEDURE — 99442 PR PHYS/QHP TELEPHONE EVALUATION 11-20 MIN: CPT | Performed by: FAMILY MEDICINE

## 2024-08-29 NOTE — PROGRESS NOTES
PROGRESS NOTE    SUBJECTIVE:   Tati Robertson is a 59 y.o. female seen for a follow up visit regarding the following chief complaint:     Chief Complaint   Patient presents with    Follow-up     Lab results.           HPI patient is doing a phone call visit to go over lab results states that she cannot take statins because she has headaches joint pains body achesMisatu Robertson is a 59 y.o. female evaluated via telephone on 8/29/2024 for Follow-up (Lab results.)  .        Total Time: minutes: 11-20 minutes    Tati Robertson was evaluated through a synchronous (real-time) audio encounter. Patient identification was verified at the start of the visit. She (or guardian if applicable) is aware that this is a billable service, which includes applicable co-pays. This visit was conducted with the patient's (and/or legal guardian's) verbal consent. She has not had a related appointment within my department in the past 7 days or scheduled within the next 24 hours.   The patient was located at Home: [address] [place] [id number].  The provider was located at Facility (Appt Dept): [address] [place] [address] Dr Brown [address] [place] [phone].    Note: not billable if this call serves to triage the patient into an appointment for the relevant concern         Past Medical History, Past Surgical History, Family history, Social History, and Medications were all reviewed with the patient today and updated as necessary.       Current Outpatient Medications   Medication Sig Dispense Refill    Evolocumab (REPATHA) SOSY syringe Inject 1 mL into the skin every 14 days 2.1 mL 5    losartan-hydroCHLOROthiazide (HYZAAR) 100-25 MG per tablet Take 1 tablet by mouth daily 90 tablet 3    ezetimibe (ZETIA) 10 MG tablet Take 1 tablet by mouth daily 90 tablet 3    ergocalciferol (ERGOCALCIFEROL) 1.25 MG (94284 UT) capsule Take 1 capsule by mouth every 7 days 12 capsule 3    levothyroxine (SYNTHROID) 112 MCG tablet Take 1 tablet

## 2024-09-13 ENCOUNTER — TELEPHONE (OUTPATIENT)
Dept: FAMILY MEDICINE CLINIC | Facility: CLINIC | Age: 59
End: 2024-09-13

## 2024-10-31 DIAGNOSIS — E03.9 HYPOTHYROIDISM, UNSPECIFIED TYPE: ICD-10-CM

## 2024-10-31 RX ORDER — LEVOTHYROXINE SODIUM 112 UG/1
112 TABLET ORAL
Qty: 90 TABLET | Refills: 0 | Status: SHIPPED | OUTPATIENT
Start: 2024-10-31

## 2024-11-04 ENCOUNTER — OFFICE VISIT (OUTPATIENT)
Dept: FAMILY MEDICINE CLINIC | Facility: CLINIC | Age: 59
End: 2024-11-04

## 2024-11-04 VITALS
SYSTOLIC BLOOD PRESSURE: 120 MMHG | HEIGHT: 64 IN | HEART RATE: 74 BPM | BODY MASS INDEX: 25.44 KG/M2 | WEIGHT: 149 LBS | DIASTOLIC BLOOD PRESSURE: 78 MMHG | TEMPERATURE: 97.8 F

## 2024-11-04 DIAGNOSIS — R10.9 STOMACH ACHE: Primary | ICD-10-CM

## 2024-11-04 DIAGNOSIS — R19.7 DIARRHEA, UNSPECIFIED TYPE: ICD-10-CM

## 2024-11-04 DIAGNOSIS — N30.00 ACUTE CYSTITIS WITHOUT HEMATURIA: ICD-10-CM

## 2024-11-04 DIAGNOSIS — A08.4 VIRAL GASTROENTERITIS: ICD-10-CM

## 2024-11-04 DIAGNOSIS — E03.9 HYPOTHYROIDISM, UNSPECIFIED TYPE: ICD-10-CM

## 2024-11-04 DIAGNOSIS — K64.8 INTERNAL HEMORRHOIDS: ICD-10-CM

## 2024-11-04 LAB
BILIRUBIN, URINE, POC: NEGATIVE
BLOOD URINE, POC: NEGATIVE
GLUCOSE URINE, POC: NEGATIVE
H. PYLORI, POC: NEGATIVE
KETONES, URINE, POC: NEGATIVE
LEUKOCYTE ESTERASE, URINE, POC: NEGATIVE
NITRITE, URINE, POC: NEGATIVE
PH, URINE, POC: 5.5 (ref 4.6–8)
PROTEIN,URINE, POC: NEGATIVE
SPECIFIC GRAVITY, URINE, POC: 1.03 (ref 1–1.03)
URINALYSIS CLARITY, POC: CLEAR
URINALYSIS COLOR, POC: YELLOW
UROBILINOGEN, POC: NORMAL
VALID INTERNAL CONTROL, POC: YES

## 2024-11-04 RX ORDER — LEVOTHYROXINE SODIUM 112 UG/1
112 TABLET ORAL
Qty: 90 TABLET | Refills: 3 | Status: SHIPPED | OUTPATIENT
Start: 2024-11-04

## 2024-11-04 RX ORDER — HYDROCORTISONE 25 MG/G
CREAM TOPICAL
Qty: 60 G | Refills: 0 | Status: SHIPPED | OUTPATIENT
Start: 2024-11-04

## 2024-11-04 RX ORDER — DIPHENOXYLATE HYDROCHLORIDE AND ATROPINE SULFATE 2.5; .025 MG/1; MG/1
1 TABLET ORAL EVERY 6 HOURS PRN
Qty: 30 TABLET | Refills: 0 | Status: SHIPPED | OUTPATIENT
Start: 2024-11-04 | End: 2024-11-12

## 2024-11-04 ASSESSMENT — ENCOUNTER SYMPTOMS
DIARRHEA: 0
COUGH: 0
SINUS PAIN: 0
BLOOD IN STOOL: 1
SHORTNESS OF BREATH: 0
RHINORRHEA: 0
ABDOMINAL PAIN: 1

## 2024-11-04 NOTE — PROGRESS NOTES
PROGRESS NOTE    SUBJECTIVE:   Tati Robertson is a 59 y.o. female seen for a follow up visit regarding the following chief complaint:     Chief Complaint   Patient presents with    Other     1. Stomach pain started 4-5 days ago. Painful bowel movements and frequent. Diarrhea. This morning bright red drops of blood in stool. Bloating and pelvic pain.  2. Frequency urination and pelvic pain.            HPI patient presents office complaining of abdominal discomfort couple episodes of diarrhea that started this morning with a few drops of blood on the toilet paper      Past Medical History, Past Surgical History, Family history, Social History, and Medications were all reviewed with the patient today and updated as necessary.       Current Outpatient Medications   Medication Sig Dispense Refill    hyoscyamine (LEVSIN/SL) 0.125 MG sublingual tablet Place 1 tablet under the tongue every 4 hours as needed for Cramping 90 tablet 3    diphenoxylate-atropine (LOMOTIL) 2.5-0.025 MG per tablet Take 1 tablet by mouth every 6 hours as needed for Diarrhea for up to 8 days. Max Daily Amount: 4 tablets 30 tablet 0    hydrocortisone (ANUSOL-HC) 2.5 % CREA rectal cream Insert into rectum 4 times daily as needed for hemorrhoids 60 g 0    levothyroxine (SYNTHROID) 112 MCG tablet Take 1 tablet by mouth every morning (before breakfast) 90 tablet 3    Evolocumab (REPATHA) SOSY syringe Inject 1 mL into the skin every 14 days 2.1 mL 5    losartan-hydroCHLOROthiazide (HYZAAR) 100-25 MG per tablet Take 1 tablet by mouth daily 90 tablet 3    ergocalciferol (ERGOCALCIFEROL) 1.25 MG (15705 UT) capsule Take 1 capsule by mouth every 7 days 12 capsule 3     No current facility-administered medications for this visit.     Allergies   Allergen Reactions    Lisinopril Other (See Comments)    Lisinopril-Hydrochlorothiazide Other (See Comments)     Persistent     Pravastatin Other (See Comments)    Rosuvastatin Other (See Comments)    Levothyroxine

## 2024-11-07 ENCOUNTER — TRANSCRIBE ORDERS (OUTPATIENT)
Facility: HOSPITAL | Age: 59
End: 2024-11-07

## 2024-11-07 DIAGNOSIS — Z12.31 VISIT FOR SCREENING MAMMOGRAM: Primary | ICD-10-CM

## 2024-11-14 DIAGNOSIS — Z00.00 LABORATORY EXAMINATION ORDERED AS PART OF A ROUTINE GENERAL MEDICAL EXAMINATION: Primary | ICD-10-CM

## 2024-11-14 DIAGNOSIS — E03.9 HYPOTHYROIDISM, UNSPECIFIED TYPE: ICD-10-CM

## 2024-11-14 DIAGNOSIS — E11.40 TYPE 2 DIABETES MELLITUS WITH DIABETIC NEUROPATHY, WITHOUT LONG-TERM CURRENT USE OF INSULIN (HCC): ICD-10-CM

## 2024-11-14 DIAGNOSIS — E55.9 VITAMIN D DEFICIENCY: ICD-10-CM

## 2024-11-14 DIAGNOSIS — E78.00 HYPERCHOLESTEREMIA: ICD-10-CM

## 2024-11-19 ENCOUNTER — LAB (OUTPATIENT)
Dept: FAMILY MEDICINE CLINIC | Facility: CLINIC | Age: 59
End: 2024-11-19
Payer: COMMERCIAL

## 2024-11-19 DIAGNOSIS — E78.00 HYPERCHOLESTEREMIA: ICD-10-CM

## 2024-11-19 DIAGNOSIS — E11.40 TYPE 2 DIABETES MELLITUS WITH DIABETIC NEUROPATHY, WITHOUT LONG-TERM CURRENT USE OF INSULIN (HCC): ICD-10-CM

## 2024-11-19 DIAGNOSIS — E55.9 VITAMIN D DEFICIENCY: ICD-10-CM

## 2024-11-19 DIAGNOSIS — E03.9 HYPOTHYROIDISM, UNSPECIFIED TYPE: ICD-10-CM

## 2024-11-19 LAB
25(OH)D3 SERPL-MCNC: 37.5 NG/ML (ref 30–100)
ALBUMIN SERPL-MCNC: 4 G/DL (ref 3.5–5)
ALBUMIN/GLOB SERPL: 1.3 (ref 1–1.9)
ALP SERPL-CCNC: 120 U/L (ref 35–104)
ALT SERPL-CCNC: 28 U/L (ref 8–45)
ANION GAP SERPL CALC-SCNC: 10 MMOL/L (ref 7–16)
AST SERPL-CCNC: 23 U/L (ref 15–37)
BILIRUB SERPL-MCNC: 0.3 MG/DL (ref 0–1.2)
BILIRUBIN, URINE, POC: NEGATIVE
BLOOD URINE, POC: NEGATIVE
BUN SERPL-MCNC: 16 MG/DL (ref 6–23)
CALCIUM SERPL-MCNC: 9.6 MG/DL (ref 8.8–10.2)
CHLORIDE SERPL-SCNC: 104 MMOL/L (ref 98–107)
CHOLEST SERPL-MCNC: 132 MG/DL (ref 0–200)
CO2 SERPL-SCNC: 27 MMOL/L (ref 20–29)
CREAT SERPL-MCNC: 0.6 MG/DL (ref 0.6–1.1)
EST. AVERAGE GLUCOSE BLD GHB EST-MCNC: 184 MG/DL
GLOBULIN SER CALC-MCNC: 3 G/DL (ref 2.3–3.5)
GLUCOSE SERPL-MCNC: 152 MG/DL (ref 70–99)
GLUCOSE URINE, POC: NEGATIVE
GRANS ABSOLUTE, POC: 4.8 K/UL
GRANULOCYTES %, POC: 65.4 %
HBA1C MFR BLD: 8.1 % (ref 0–5.6)
HDLC SERPL-MCNC: 43 MG/DL (ref 40–60)
HDLC SERPL: 3.1 (ref 0–5)
HEMATOCRIT, POC: 40.7 %
HEMOGLOBIN, POC: 13.1 G/DL
KETONES, URINE, POC: NEGATIVE
LDLC SERPL CALC-MCNC: 47 MG/DL (ref 0–100)
LEUKOCYTE ESTERASE, URINE, POC: NEGATIVE
LYMPHOCYTE %, POC: 27.3 %
LYMPHS ABSOLUTE, POC: 2 K/UL
MCH, POC: ABNORMAL PG (ref 40–?)
MCHC, POC: 32.2
MCV, POC: 87.8
MICROALB/CREAT RATIO, POC: ABNORMAL
MICROALBUMIN URINE, POC: 50 MG/L
MONOCYTE %, POC: 7.3 %
MONOCYTE, ABSOLUTE POC: 0.5 K/UL
MPV, POC: 7.4 FL
NITRITE, URINE, POC: NEGATIVE
PH, URINE, POC: 6 (ref 4.6–8)
PLATELET COUNT, POC: ABNORMAL K/UL
POTASSIUM SERPL-SCNC: 4.4 MMOL/L (ref 3.5–5.1)
PROT SERPL-MCNC: 7 G/DL (ref 6.3–8.2)
PROTEIN,URINE, POC: NEGATIVE
RBC, POC: 4.64 M/UL
RDW, POC: 12.8 %
SODIUM SERPL-SCNC: 141 MMOL/L (ref 136–145)
SPECIFIC GRAVITY, URINE, POC: 1.03 (ref 1–1.03)
TRIGL SERPL-MCNC: 210 MG/DL (ref 0–150)
TSH, 3RD GENERATION: 2.4 UIU/ML (ref 0.27–4.2)
URINALYSIS CLARITY, POC: CLEAR
URINALYSIS COLOR, POC: YELLOW
UROBILINOGEN, POC: NORMAL
VLDLC SERPL CALC-MCNC: 42 MG/DL (ref 6–23)
WBC, POC: 7.4 K/UL

## 2024-11-19 PROCEDURE — 82043 UR ALBUMIN QUANTITATIVE: CPT | Performed by: FAMILY MEDICINE

## 2024-12-03 ENCOUNTER — OFFICE VISIT (OUTPATIENT)
Dept: FAMILY MEDICINE CLINIC | Facility: CLINIC | Age: 59
End: 2024-12-03
Payer: COMMERCIAL

## 2024-12-03 VITALS
HEIGHT: 64 IN | SYSTOLIC BLOOD PRESSURE: 116 MMHG | DIASTOLIC BLOOD PRESSURE: 80 MMHG | BODY MASS INDEX: 26.63 KG/M2 | HEART RATE: 88 BPM | WEIGHT: 156 LBS

## 2024-12-03 DIAGNOSIS — M54.50 ACUTE MIDLINE LOW BACK PAIN WITHOUT SCIATICA: ICD-10-CM

## 2024-12-03 DIAGNOSIS — M54.2 NECK PAIN: ICD-10-CM

## 2024-12-03 DIAGNOSIS — I10 PRIMARY HYPERTENSION: ICD-10-CM

## 2024-12-03 DIAGNOSIS — Z78.9 STATIN INTOLERANCE: ICD-10-CM

## 2024-12-03 DIAGNOSIS — R73.9 ELEVATED BLOOD SUGAR: ICD-10-CM

## 2024-12-03 DIAGNOSIS — Z13.31 SCREENING FOR DEPRESSION: ICD-10-CM

## 2024-12-03 DIAGNOSIS — E55.9 VITAMIN D DEFICIENCY: ICD-10-CM

## 2024-12-03 DIAGNOSIS — E78.49 FAMILIAL HYPERLIPIDEMIA: ICD-10-CM

## 2024-12-03 DIAGNOSIS — Z00.00 ROUTINE GENERAL MEDICAL EXAMINATION AT A HEALTH CARE FACILITY: Primary | ICD-10-CM

## 2024-12-03 DIAGNOSIS — E03.9 HYPOTHYROIDISM, UNSPECIFIED TYPE: ICD-10-CM

## 2024-12-03 PROCEDURE — 3079F DIAST BP 80-89 MM HG: CPT | Performed by: FAMILY MEDICINE

## 2024-12-03 PROCEDURE — 3074F SYST BP LT 130 MM HG: CPT | Performed by: FAMILY MEDICINE

## 2024-12-03 PROCEDURE — 99396 PREV VISIT EST AGE 40-64: CPT | Performed by: FAMILY MEDICINE

## 2024-12-03 PROCEDURE — 96372 THER/PROPH/DIAG INJ SC/IM: CPT | Performed by: FAMILY MEDICINE

## 2024-12-03 RX ORDER — KETOROLAC TROMETHAMINE 30 MG/ML
60 INJECTION, SOLUTION INTRAMUSCULAR; INTRAVENOUS ONCE
Status: COMPLETED | OUTPATIENT
Start: 2024-12-03 | End: 2024-12-03

## 2024-12-03 RX ORDER — ERGOCALCIFEROL 1.25 MG/1
50000 CAPSULE ORAL
Qty: 12 CAPSULE | Refills: 3 | Status: SHIPPED | OUTPATIENT
Start: 2024-12-03

## 2024-12-03 RX ORDER — LOSARTAN POTASSIUM AND HYDROCHLOROTHIAZIDE 25; 100 MG/1; MG/1
1 TABLET ORAL DAILY
Qty: 90 TABLET | Refills: 3 | Status: SHIPPED | OUTPATIENT
Start: 2024-12-03

## 2024-12-03 RX ORDER — LEVOTHYROXINE SODIUM 112 UG/1
112 TABLET ORAL
Qty: 90 TABLET | Refills: 3 | Status: SHIPPED | OUTPATIENT
Start: 2024-12-03

## 2024-12-03 RX ORDER — NAPROXEN 500 MG/1
500 TABLET ORAL 2 TIMES DAILY PRN
Qty: 60 TABLET | Refills: 5 | Status: SHIPPED | OUTPATIENT
Start: 2024-12-03

## 2024-12-03 RX ADMIN — KETOROLAC TROMETHAMINE 60 MG: 30 INJECTION, SOLUTION INTRAMUSCULAR; INTRAVENOUS at 09:05

## 2024-12-03 ASSESSMENT — ENCOUNTER SYMPTOMS
SHORTNESS OF BREATH: 0
COUGH: 0
ABDOMINAL PAIN: 0
BACK PAIN: 1

## 2024-12-03 NOTE — PROGRESS NOTES
if no improvement discussed possibility of medication patient did not do good with metformin for weight loss and diabetes she wants to think about injectables I told her to find out which medication is covered by her insurance and recheck her labs in the near future pending those results pending what medications her insurance will cover etc. terms of her back pain we will give her a Toradol shot recommended Naprosyn stretching exercises and if she continues to have pain discussed possibility of physical therapy MRI referral to Ortho etc.

## 2025-01-03 ENCOUNTER — HOSPITAL ENCOUNTER (OUTPATIENT)
Dept: MAMMOGRAPHY | Age: 60
Discharge: HOME | End: 2025-01-03
Attending: FAMILY MEDICINE
Payer: COMMERCIAL

## 2025-01-03 DIAGNOSIS — Z12.31 VISIT FOR SCREENING MAMMOGRAM: ICD-10-CM

## 2025-01-03 PROCEDURE — 77063 BREAST TOMOSYNTHESIS BI: CPT

## 2025-05-20 ENCOUNTER — LAB (OUTPATIENT)
Dept: FAMILY MEDICINE CLINIC | Facility: CLINIC | Age: 60
End: 2025-05-20

## 2025-05-20 DIAGNOSIS — R73.9 ELEVATED BLOOD SUGAR: ICD-10-CM

## 2025-05-20 DIAGNOSIS — E78.49 FAMILIAL HYPERLIPIDEMIA: ICD-10-CM

## 2025-05-20 LAB
CHOLEST SERPL-MCNC: 148 MG/DL (ref 0–200)
EST. AVERAGE GLUCOSE BLD GHB EST-MCNC: 198 MG/DL
HBA1C MFR BLD: 8.5 % (ref 0–5.6)
HDLC SERPL-MCNC: 45 MG/DL (ref 40–60)
HDLC SERPL: 3.3 (ref 0–5)
LDLC SERPL CALC-MCNC: 34 MG/DL (ref 0–100)
TRIGL SERPL-MCNC: 345 MG/DL (ref 0–150)
VLDLC SERPL CALC-MCNC: 69 MG/DL (ref 6–23)

## 2025-05-29 ENCOUNTER — TELEMEDICINE (OUTPATIENT)
Dept: FAMILY MEDICINE CLINIC | Facility: CLINIC | Age: 60
End: 2025-05-29
Payer: COMMERCIAL

## 2025-05-29 DIAGNOSIS — M19.90 ACUTE ARTHRITIS: ICD-10-CM

## 2025-05-29 DIAGNOSIS — N20.0 KIDNEY STONE: Primary | ICD-10-CM

## 2025-05-29 DIAGNOSIS — E03.9 HYPOTHYROIDISM, UNSPECIFIED TYPE: ICD-10-CM

## 2025-05-29 DIAGNOSIS — I10 PRIMARY HYPERTENSION: ICD-10-CM

## 2025-05-29 DIAGNOSIS — E78.49 FAMILIAL HYPERLIPIDEMIA: ICD-10-CM

## 2025-05-29 DIAGNOSIS — M54.50 ACUTE BILATERAL LOW BACK PAIN WITHOUT SCIATICA: ICD-10-CM

## 2025-05-29 DIAGNOSIS — Z78.9 STATIN INTOLERANCE: ICD-10-CM

## 2025-05-29 DIAGNOSIS — E11.40 TYPE 2 DIABETES MELLITUS WITH DIABETIC NEUROPATHY, WITHOUT LONG-TERM CURRENT USE OF INSULIN (HCC): ICD-10-CM

## 2025-05-29 PROCEDURE — 99214 OFFICE O/P EST MOD 30 MIN: CPT | Performed by: FAMILY MEDICINE

## 2025-05-29 RX ORDER — LEVOTHYROXINE SODIUM 112 UG/1
112 TABLET ORAL
Qty: 90 TABLET | Refills: 3 | Status: SHIPPED | OUTPATIENT
Start: 2025-05-29

## 2025-05-29 RX ORDER — LOSARTAN POTASSIUM AND HYDROCHLOROTHIAZIDE 25; 100 MG/1; MG/1
1 TABLET ORAL DAILY
Qty: 90 TABLET | Refills: 3 | Status: SHIPPED | OUTPATIENT
Start: 2025-05-29

## 2025-05-29 RX ORDER — CYCLOBENZAPRINE HCL 10 MG
10 TABLET ORAL NIGHTLY PRN
Qty: 30 TABLET | Refills: 0 | Status: SHIPPED | OUTPATIENT
Start: 2025-05-29

## 2025-05-29 RX ORDER — MELOXICAM 15 MG/1
15 TABLET ORAL DAILY PRN
Qty: 90 TABLET | Refills: 3 | Status: SHIPPED | OUTPATIENT
Start: 2025-05-29

## 2025-05-29 SDOH — ECONOMIC STABILITY: FOOD INSECURITY: WITHIN THE PAST 12 MONTHS, YOU WORRIED THAT YOUR FOOD WOULD RUN OUT BEFORE YOU GOT MONEY TO BUY MORE.: NEVER TRUE

## 2025-05-29 SDOH — ECONOMIC STABILITY: FOOD INSECURITY: WITHIN THE PAST 12 MONTHS, THE FOOD YOU BOUGHT JUST DIDN'T LAST AND YOU DIDN'T HAVE MONEY TO GET MORE.: NEVER TRUE

## 2025-05-29 ASSESSMENT — PATIENT HEALTH QUESTIONNAIRE - PHQ9
2. FEELING DOWN, DEPRESSED OR HOPELESS: NOT AT ALL
SUM OF ALL RESPONSES TO PHQ QUESTIONS 1-9: 0
1. LITTLE INTEREST OR PLEASURE IN DOING THINGS: NOT AT ALL

## 2025-05-29 ASSESSMENT — ENCOUNTER SYMPTOMS
SHORTNESS OF BREATH: 0
VOMITING: 0
NAUSEA: 0

## 2025-05-29 NOTE — PROGRESS NOTES
PROGRESS NOTE        Consent:    Tati Robertson, was evaluated through a synchronous (real-time) audio-video encounter. The patient (or guardian if applicable) is aware that this is a billable service, which includes applicable co-pays. This Virtual Visit was conducted with patient's (and/or legal guardian's) consent. Patient identification was verified, and a caregiver was present when appropriate.   The patient was located at Home: [address] [place] [id number]  Provider was located at Facility (Appt Dept): [address] [place] [address]  Confirm you are appropriately licensed, registered, or certified to deliver care in the state where the patient is located as indicated above. If you are not or unsure, please re-schedule the visit: Yes, I confirm.        On this date 5/29/2025 I have spent 20 minutes reviewing previous notes, test results, and other pertinent medical information with the patient, discussing the diagnosis and importance of compliance with the treatment plan as well as documenting on the day of the visit.. Greater than 50% of this visit was spent counseling the patient about test results, prognosis, importance of compliance, education about disease process, benefits of medications, instructions for management of acute symptoms, and follow up plans.      Chief Complaint   Patient presents with    Follow-up       SUBJECTIVE:     Tati Robertson is a 60 y.o. female , with past medical history significant for diabetes hyperlipidemia low back pain hypertension hypothyroidism, seen virtually regarding her lab results      Past Medical History, Past Surgical History, Family history, Social History, and Medications were all reviewed with the patient today and updated as necessary.       Current Outpatient Medications   Medication Sig Dispense Refill    Evolocumab (REPATHA) SOSY syringe Inject 1 mL into the skin every 14 days 2.1 mL 11    cyclobenzaprine

## (undated) DEVICE — SYR 3ML LL TIP 1/10ML GRAD --

## (undated) DEVICE — CANNULA NSL ORAL AD FOR CAPNOFLEX CO2 O2 AIRLFE

## (undated) DEVICE — CONNECTOR TBNG OD5-7MM O2 END DISP

## (undated) DEVICE — SYR 5ML 1/5 GRAD LL NSAF LF --

## (undated) DEVICE — NDL PRT INJ NSAF BLNT 18GX1.5 --

## (undated) DEVICE — KENDALL RADIOLUCENT FOAM MONITORING ELECTRODE RECTANGULAR SHAPE: Brand: KENDALL